# Patient Record
Sex: FEMALE | Race: WHITE | NOT HISPANIC OR LATINO | Employment: FULL TIME | URBAN - METROPOLITAN AREA
[De-identification: names, ages, dates, MRNs, and addresses within clinical notes are randomized per-mention and may not be internally consistent; named-entity substitution may affect disease eponyms.]

---

## 2019-07-10 ENCOUNTER — TRANSCRIBE ORDERS (OUTPATIENT)
Dept: MATERNAL FETAL MEDICINE | Facility: CLINIC | Age: 24
End: 2019-07-10

## 2019-07-10 ENCOUNTER — HOSPITAL ENCOUNTER (OUTPATIENT)
Dept: ULTRASOUND IMAGING | Facility: CLINIC | Age: 24
Discharge: HOME | End: 2019-07-10
Attending: OBSTETRICS & GYNECOLOGY | Admitting: OBSTETRICS & GYNECOLOGY
Payer: COMMERCIAL

## 2019-07-10 ENCOUNTER — OFFICE VISIT (OUTPATIENT)
Dept: MATERNAL FETAL MEDICINE | Facility: CLINIC | Age: 24
End: 2019-07-10
Attending: OBSTETRICS & GYNECOLOGY
Payer: COMMERCIAL

## 2019-07-10 ENCOUNTER — PRE VISIT (OUTPATIENT)
Dept: MATERNAL FETAL MEDICINE | Facility: CLINIC | Age: 24
End: 2019-07-10

## 2019-07-10 DIAGNOSIS — O26.90 PREGNANCY RELATED CONDITION, ANTEPARTUM: ICD-10-CM

## 2019-07-10 DIAGNOSIS — O35.9XX0 FETAL ABNORMALITY AFFECTING MANAGEMENT OF MOTHER, ANTEPARTUM, SINGLE OR UNSPECIFIED FETUS: ICD-10-CM

## 2019-07-10 DIAGNOSIS — O35.8XX0 CYSTIC HYGROMA OF FETUS IN SINGLETON PREGNANCY: Primary | ICD-10-CM

## 2019-07-10 DIAGNOSIS — O26.90 PREGNANCY RELATED CONDITION, ANTEPARTUM: Primary | ICD-10-CM

## 2019-07-10 DIAGNOSIS — O35.8XX0 CYSTIC HYGROMA OF FETUS IN SINGLETON PREGNANCY: ICD-10-CM

## 2019-07-10 DIAGNOSIS — O28.3 ABNORMAL PRENATAL ULTRASOUND: Primary | ICD-10-CM

## 2019-07-10 PROCEDURE — 76801 OB US < 14 WKS SINGLE FETUS: CPT

## 2019-07-10 PROCEDURE — 40000072 ZZH STATISTIC GENETIC COUNSELING, < 16 MIN: Mod: ZF | Performed by: GENETIC COUNSELOR, MS

## 2019-07-10 NOTE — PROGRESS NOTES
Please see full imaging report from ViewPoint program under imaging tab.    Findings were reviewed with Trinity and her family today, including a detailed discussion regarding outcomes/etiologies, and opportunities to obtain genetic assessment. At this time, they would like to proceed with NIPT which can be drawn at anytime after 10 weeks gestation. She is set up to have this done through our office early next week and we will see her again at 14 weeks for another US. In the interim she will have close follow up for reassurance and confirmation of ongoing viability with [facility], who were contacted today, and plan to see her for a follow up visit next week.     We did discuss up to 50% risk of aneuploidy, high risk of spontaneous fetal loss, and also risk of other concerns such as fetal abnormalities including congenital heart disease and skeletal dysplasia. There is no family history of anomalies or adverse pregnancy outcome, although Trinity has had one prior early loss  Certainly if aneuploidy screening is low risk/negative definitive testing, TORCH titers should also be considered  - this is a less common etiology but should be considered if there is a low risk of aneuploidy. She has had no fever or rash or significant sick contacts in the past several months.     Ryann Arnett MD  Maternal Fetal Medicine

## 2019-07-10 NOTE — PROGRESS NOTES
[facility] [place] [facility] [place]  Genetic Counseling Consult    Patient: Trinity High YOB: 1995   Date of Service:  7/10/19      Trinity High was seen at the [facility] [place] [facility] for genetic consultation given abnormal ultrasound findings of a cystic hygroma. Trinity was accompanied to today's appointment by her partner and her mother.    Impression/Plan:   Trinity had an ultrasound today, but gestational age was too early for first trimester screen eligibility.  See ultrasound report for details.    Plan: Trinity elected to proceed with NIPT in this pregnancy. Since her gestational age is younger than 10 weeks, she will be returning for a GC only appointment on  at [facility] for consent and blood draw. Prior to that appointment, on  at 8am she will be seeing Dr. Madeline Heredia, her OBGYN.     Pregnancy History:   /Parity:                            Age at Delivery: 24 year old  NIYA: 2020, by Last Menstrual Period                  Gestational Age: 9w6d  -  No significant complications or exposures were reported in the current pregnancy.  -  Trinity s pregnancy history is significant for one prior pregnancy.    Medical History:   Trinity s reported medical history is not expected to impact pregnancy management or risks to fetal development.       Family History:   A three-generation pedigree was obtained, and is scanned under the  Media  tab.   The following significant findings were reported by Trinity:    Trinity's father and two paternal cousins have polydactyly with no other medical concerns.    Trinity's first cousin reportedly has developmental delay due to etiology that is unknown at today's appointment.    Trinity's partner has a maternal first cousin with progressive childhood onset deafness of unknown etiology at today's appointment.     Otherwise, the reported family history is negative for multiple  miscarriages, stillbirths, birth defects, cognitive impairment, known genetic conditions, and consanguinity.       Carrier Screening:   The patient reports that she and the father of the pregnancy have  ancestry:      Cystic fibrosis is an autosomal recessive genetic condition that occurs with increased frequency in individuals of  ancestry and carrier screening for this condition is available.  In addition,  screening in the [facility] includes cystic fibrosis.      Expanded carrier screening for mutations in a large panel of genes associated with autosomal recessive conditions including cystic fibrosis, spinal muscular atrophy, and others, is now available.      Carrier screening was beyond the scope of our discussion today.        Risk Assessment:   We explained that the risk for fetal chromosome abnormalities increases with maternal age. We discussed specific features of common chromosome abnormalities, including Down syndrome, trisomy 13, trisomy 18, and sex chromosome trisomies.      - At age 23 at midtrimester, the risk to have a baby with Down syndrome is 1 in 1040.    - At age 23 at midtrimester, the risk to have a baby with any chromosome abnormality is 1 in 520.     -  The patient had a fetal anatomy scan on 7/10/19.  The ultrasound showed a cystic hygroma, which we discussed increases the risk of Cruz's Syndrome and Down Syndrome in the patient's pregnancy.      Testing Options:   We discussed the following options:   Non-invasive Prenatal Testing (NIPT)    Maternal plasma cell-free DNA testing; first trimester ultrasound with nuchal translucency and nasal bone assessment is recommended, when appropriate    Screens for fetal trisomy 21, trisomy 13, trisomy 18, and sex chromosome aneuploidy    Cannot screen for open neural tube defects; maternal serum AFP after 15 weeks is recommended       Chorionic villus sampling (CVS)    Invasive procedure typically performed in the  first trimester by which placental villi are obtained for the purpose of chromosome analysis and/or other prenatal genetic analysis    Diagnostic results; >99% sensitivity for fetal chromosome abnormalities    Cannot test for open neural tube defects; maternal serum AFP after 15 weeks is recommended       Genetic Amniocentesis    Invasive procedure typically performed in the second trimester by which amniotic fluid is obtained for the purpose of chromosome analysis and/or other prenatal genetic analysis    Diagnostic results; >99% sensitivity for fetal chromosome abnormalities    AFAFP measurement tests for open neural tube defects      We reviewed the benefits and limitations of this testing.  Screening tests provide a risk assessment specific to the pregnancy for certain fetal chromosome abnormalities, but cannot definitively diagnose or exclude a fetal chromosome abnormality.  Follow-up genetic counseling and consideration of diagnostic testing is recommended with any abnormal screening result.     Diagnostic tests carry inherent risks- including risk of miscarriage- that require careful consideration. These tests can detect fetal chromosome abnormalities with greater than 99% certainty.  Results can be compromised by maternal cell contamination or mosaicism, and are limited by the resolution of cytogenetic G-banding technology.  There is no screening nor diagnostic test that can detect all forms of birth defects or mental disability.    Plan: Trinity elected to proceed with NIPT in this pregnancy. Since her gestational age is younger than 10 weeks, she will be returning for a  only appointment on Tuesday July 16th at [facility] for consent and blood draw. Prior to that appointment, on 7/16 at 8am she will be seeing Dr. Madeline Heredia, her OBGYN.     I encouraged Trinity to call me with any questions. Face-to-face time of the meeting was 40 minutes.    Sharee Lopez MS  Genetic Counseling Intern  Maternal  Fetal Medicine  [facility]   Phone: [phone]  Pager: [phone]  Email: [email or web address]    Patient seen, evaluated and discussed with the Genetic Counseling Intern. I have verified the content of the note, which accurately reflects my assessment of the patient and the plan of care.  Supervising Genetic Counselor  Rosalia Anders MS, [facility]  Licensed Genetic Counselor   [facility]   Maternal Fetal Medicine [facility]  [email or web address]   Office: [phone] M: [phone]  Pager: [phone] Fax: [phone]

## 2019-07-16 ENCOUNTER — OFFICE VISIT (OUTPATIENT)
Dept: MATERNAL FETAL MEDICINE | Facility: CLINIC | Age: 24
End: 2019-07-16
Attending: OBSTETRICS & GYNECOLOGY
Payer: COMMERCIAL

## 2019-07-16 ENCOUNTER — HOSPITAL ENCOUNTER (OUTPATIENT)
Dept: LAB | Facility: CLINIC | Age: 24
Discharge: HOME | End: 2019-07-16
Attending: OBSTETRICS & GYNECOLOGY | Admitting: OBSTETRICS & GYNECOLOGY
Payer: COMMERCIAL

## 2019-07-16 DIAGNOSIS — O28.3 ABNORMAL PRENATAL ULTRASOUND: ICD-10-CM

## 2019-07-16 DIAGNOSIS — O26.90 PREGNANCY RELATED CONDITION, ANTEPARTUM: ICD-10-CM

## 2019-07-16 DIAGNOSIS — O35.8XX0 CYSTIC HYGROMA OF FETUS IN SINGLETON PREGNANCY: ICD-10-CM

## 2019-07-16 DIAGNOSIS — O35.8XX0 CYSTIC HYGROMA OF FETUS IN SINGLETON PREGNANCY: Primary | ICD-10-CM

## 2019-07-16 PROCEDURE — 96040 ZZH GENETIC COUNSELING, EACH 30 MINUTES: CPT | Mod: ZF | Performed by: GENETIC COUNSELOR, MS

## 2019-07-16 PROCEDURE — 40000791 ZZHCL STATISTIC VERIFI PRENATAL TRISOMY 21,18,13: Performed by: OBSTETRICS & GYNECOLOGY

## 2019-07-16 PROCEDURE — 36415 COLL VENOUS BLD VENIPUNCTURE: CPT | Performed by: OBSTETRICS & GYNECOLOGY

## 2019-07-16 NOTE — PROGRESS NOTES
[facility] [place]  Genetic Counseling Consult    Patient: Trinity High YOB: 1995   Date of Service: 19      Trinity High was seen at [facility] for genetic consultation to discuss the options for screening and testing for fetal chromosome abnormalities.  The indication for genetic counseling is cystic hygroma.        Impression/Plan:   1.  Trinity had a blood draw for NIPT (Innatal test through [facility]).  Results are expected within 7-10 days, and will be available in EPIC.  We will contact her to discuss the results, and a copy will be forwarded to the office of the referring OB provider. Trinity High provided verbal permission for results to be left on her voicemail at [phone]. She requested the results do include the sex.     2. Trinity was concerned when she should return for an ultrasound next. In consultation with today's staffing physician, Dr. George, a return to [facility]    next for a nuchal translucency follow-up ultrasound was offered before her 2/3 ultrasound. Trinity elected for both ultrasounds. She will have her nuchal translucency ultrasound on  and her 2/3 ultrasound on .     3. We discussed that NIPT results should be available by the time of her NT ultrasound on . Trinity and her partner have not eliminated the option of diagnostic testing but they are unsure at this point and would like to go step by step. We discussed the plan of assessing their desire for a CVS at their NT ultrasound as Trinity would be 12w5d and have more information from the NIPT and NT ultrasound to make that decision. If they elect diagnostic testing, a CVS can be performed through 13w6d gestation.    Pregnancy History:   /Parity:     Age at Delivery: 24 year old  NIYA: 2020, by Last Menstrual Period  Gestational Age: 10w5d    Medical History:   Trinity davalos reported medical history is not  "expected to impact pregnancy management or risks to fetal development.       Family History:   A three-generation pedigree was obtained during a previous 07/10/2019 visit, and is scanned under the  Media  tab. Please see that note for more details.     Carrier Screening:   Carrier screening was not discussed today.       Risk Assessment for Chromosome Conditions:   We explained that the risk for fetal chromosome abnormalities increases with maternal age. We discussed specific features of common chromosome abnormalities, including Down syndrome, trisomy 13, trisomy 18, and sex chromosome trisomies.      At age 24 at midtrimester, the risk to have a baby with Down syndrome is 1 in 990.    At age 24 at midtrimester, the risk to have a baby with any chromosome abnormality is 1 in 495.     We reviewed the increased nuchal translucency finding from the 07/10/2019 ultrasound. Trinity and her partner said they were quite overwhelmed at their last appointment. We reviewed the basics such as what the sonographers were looking for and how we use a cystic hygroma to assess risks to the pregnancy. Trinity's partner asked about the accuracy of the finding given that the \"baby is still so small\". We discussed how visualization of a cystic hygroma is a finding our sonographers are trained to see. Trinity will have a formal NT measurement done on July 30 and that could provide a numerical assessment of the hygroma. Trinity's partner explained that \"we trust you all but we just want it to not be true\". We discussed that a cystic hygroma indicates there may be abnormal development that is associated with a chromosome abnormality. The couple asked if hygromas can resolve and we discussed they can but this is more likely in the absence of a chromosome abnormality. We discussed the frustrating nature of not having the whole clinical picture yet.       Nuchal translucency refers to a region between the skin and soft tissues behind the " cervical spine. This space is expected to have an amount of fluid between the 10th and 14th weeks of gestation and is considered normal below a defined threshold. The nuchal translucency measurement is taken when the fetus measures a crown-rump length between 36 to 84 mm which corresponds to approximately the 10th to 14 week of gestation. Typically any measurement over 3.0 mm or the 95th percentile is concerning.     Increased nuchal translucency has been associated with an increased risk for aneuploidy, heart defects, and other more rare genetic conditions. Approximately 20-65% of babies who have an increased nuchal translucency on ultrasound may have an associated chromosome abnormality, most commonly Down syndrome or Cruz syndrome. As the measurement increases the chance of aneuploidy, fetal death, and major anomalies increases.  Increased nuchal translucency has been associated with a 5-20% risk for a heart defect.     Increased nuchal translucency can resolve spontaneously by the second trimester and this is reassuring in the context of a normal euploid fetus. However, due to the finding of an increased nuchal translucency, extra ultrasounds and possibly a fetal echocardiogram may still be indicated. If the increased nuchal translucency remains or progresses there is concern for an underlying abnormality even in the context of normal screening or diagnostic chromosome analysis.     A cystic hygroma is a congenital malformation resulting from lymph accumulation and are often large in size and extend further down the length of the fetus than an increased nuchal translucency. Cystic hygromas are generally more concerning than an increased nuchal translucency. The mean size of a first trimester cystic hygroma is 8mm with some measuring over 30mm. Cystic hygromas can be septated or simple. Cystic hygromas are associated with an increased risk for aneuploidy and structural malformations which can increase the risk  for miscarriages, hydrops, fetal demise, and  death.  A septated cystic hygroma does have increased risk for aneuploidy compared to simple. Furthermore, one third of euploid fetuses with first trimester septated cystic hygromas have major structural anomalies, primarily cardiac and skeletal. In comparison, over 80% of euploidy fetuses with first trimester simple cystic hygromas have them resolve within four weeks and are phenotypically normal     We discussed the options of screening by NIPT or diagnostic options such as a CVS or amniocentesis. Trinity elected for NIPT today and while she is unsure about diagnostic testing, she would like to assess the option after her NT ultrasound and once the NIPT results are available.          Testing Options:   We discussed the following options:   Non-invasive Prenatal Testing (NIPT)    Maternal plasma cell-free DNA testing; first trimester ultrasound with nuchal translucency and nasal bone assessment is recommended, when appropriate    Screens for fetal trisomy 21, trisomy 13, trisomy 18, and sex chromosome aneuploidy    Cannot screen for open neural tube defects; maternal serum AFP after 15 weeks is recommended     Chorionic villus sampling (CVS)    Invasive procedure typically performed in the first trimester by which placental villi are obtained for the purpose of chromosome analysis and/or other prenatal genetic analysis    Diagnostic results; >99% sensitivity for fetal chromosome abnormalities    Cannot test for open neural tube defects; maternal serum AFP after 15 weeks is recommended     Genetic Amniocentesis    Invasive procedure typically performed in the second trimester by which amniotic fluid is obtained for the purpose of chromosome analysis and/or other prenatal genetic analysis    Diagnostic results; >99% sensitivity for fetal chromosome abnormalities    AFAFP measurement tests for open neural tube defects     Comprehensive (Level II) ultrasound:  Detailed ultrasound performed between 18-22 weeks gestation to screen for major birth defects and markers for aneuploidy.    We reviewed the benefits and limitations of this testing.  Screening tests provide a risk assessment specific to the pregnancy for certain fetal chromosome abnormalities, but cannot definitively diagnose or exclude a fetal chromosome abnormality.  Follow-up genetic counseling and consideration of diagnostic testing is recommended with any abnormal screening result.     Diagnostic tests carry inherent risks- including risk of miscarriage- that require careful consideration.  These tests can detect fetal chromosome abnormalities with greater than 99% certainty.  Results can be compromised by maternal cell contamination or mosaicism, and are limited by the resolution of cytogenetic G-banding technology.  There is no screening nor diagnostic test that can detect all forms of birth defects or mental disability.     It was a pleasure to be involved with Trinity s care. Face-to-face time of the meeting was 25 minutes.    Rosalia Anders MS, [facility]  Licensed Genetic Counselor   [facility]   Maternal Fetal Medicine Centers  [email or web address]   Office: [phone] MFM: [phone]  Pager: [phone] Fax: [phone]

## 2019-07-29 ENCOUNTER — TELEPHONE (OUTPATIENT)
Dept: MATERNAL FETAL MEDICINE | Facility: CLINIC | Age: 24
End: 2019-07-29

## 2019-07-29 NOTE — TELEPHONE ENCOUNTER
Called Cyndy to discuss her NIPT results. Cyndy's pregnancy was diagnosed with a cystic hygroma at 9w3d.     Cyndy's NIPT was positive for Monosomy X. We discussed the positive predictive value, which is the probability that that a pregnancy with a positive result truly has the diease. For Cyndy's result, the positive predictive value is 9-14%. The 9% was provided by [employer] and the 14% is calculated from the NIPT/ Cell Free DNA Screening Predictive Value Calculator from the National Society of Genetic Counselors and  Quality.org which is based on the prevalence of the condition, and the specificity and sensitivity of the screen. This means the probability the pregnancy is affected with Cruz syndrome, based on this result, is 9-14%. However, the chance of monosomy  X in this pregnancy is likely higher due to the cystic hygroma. We discussed while I cannot provide a specific number it would likely be significantly more than 14%. We did review that while cystic hygroma is a common feature of monosomy X, this result does not diagnose the pregnancy.     Cyndy has a nuchal translucency ultrasound tomorrow on  for a formal measurement of the hygroma and a viability check. I offered we could speak more tomorrow as Cyndy was at work when I called her. We briefly discussed that we could review the possible different options people take as far as diagnostic testing. We also discussed that in the absence of diagnostic testing recommendations would be made such as fetal echocardiogram since we know there is an increased risk for cardiac defects in monosomy X. Although this recommendation would already stand due to the cystic hygroma. I mentioned I would have several written resources for Cyndy tomorrow. I explained my goal was to provide Cyndy and her  with the level of information they feel is helpful at a pace they are comfortable with.     Cyndy was understandably upset and we made the plan to  discuss more in person after her ultrasound.     We did discuss the results were negative or no abnormality detected for chromosomes 21, 18, and 12. This puts her current pregnancy at low risk for Down syndrome, trisomy 18, and trisomy 13.Although these results are reassuring, this does not replace a standard chromosome analysis from a chorionic villus sampling or amniocentesis.     MSAFP is the appropriate second trimester screening test for open neural tube defects; the maternal quad screen is not recommended.    Her results will be faxed to her primary OB provider. More notes from our discussion on Tuesday will also be faxed.   Rosalia Anders MS, [facility]  Licensed Genetic Counselor   [facility]   Maternal Fetal Medicine Centers  [email or web address]   Office: [phone] [facility]: [phone]  Pager: [phone] Fax: [phone]

## 2019-07-30 ENCOUNTER — HOSPITAL ENCOUNTER (OUTPATIENT)
Dept: ULTRASOUND IMAGING | Facility: CLINIC | Age: 24
Discharge: HOME | End: 2019-07-30
Attending: OBSTETRICS & GYNECOLOGY | Admitting: OBSTETRICS & GYNECOLOGY
Payer: COMMERCIAL

## 2019-07-30 ENCOUNTER — OFFICE VISIT (OUTPATIENT)
Dept: MATERNAL FETAL MEDICINE | Facility: CLINIC | Age: 24
End: 2019-07-30
Attending: OBSTETRICS & GYNECOLOGY
Payer: COMMERCIAL

## 2019-07-30 DIAGNOSIS — O35.8XX0 CYSTIC HYGROMA OF FETUS IN SINGLETON PREGNANCY: Primary | ICD-10-CM

## 2019-07-30 DIAGNOSIS — O28.0 ABNORMAL MATERNAL SERUM SCREENING TEST: ICD-10-CM

## 2019-07-30 DIAGNOSIS — O28.0 ABNORMAL MATERNAL SERUM SCREENING TEST: Primary | ICD-10-CM

## 2019-07-30 DIAGNOSIS — O28.3 ABNORMAL PRENATAL ULTRASOUND: ICD-10-CM

## 2019-07-30 DIAGNOSIS — O28.3 ABNORMAL FETAL ULTRASOUND: Primary | ICD-10-CM

## 2019-07-30 DIAGNOSIS — O35.8XX0 CYSTIC HYGROMA OF FETUS IN SINGLETON PREGNANCY: ICD-10-CM

## 2019-07-30 DIAGNOSIS — O26.90 PREGNANCY RELATED CONDITION, ANTEPARTUM: ICD-10-CM

## 2019-07-30 DIAGNOSIS — O28.3 ABNORMAL FETAL ULTRASOUND: ICD-10-CM

## 2019-07-30 PROCEDURE — 76801 OB US < 14 WKS SINGLE FETUS: CPT

## 2019-07-30 PROCEDURE — 40000072 ZZH STATISTIC GENETIC COUNSELING, < 16 MIN: Mod: ZF | Performed by: GENETIC COUNSELOR, MS

## 2019-07-30 NOTE — PROGRESS NOTES
Please see ultrasound report under imaging tab for details on ultrasound performed today.    Eula George MD  , OB/GYN  Maternal-Fetal Medicine  [email or web address]  [phone] (Academic office)  [phone] (Pager)

## 2019-07-30 NOTE — PROGRESS NOTES
[facility] Fetal Medicine [place]  Genetic Counseling Consult    Patient:  Trinity High YOB: 1995   Date of Service:  19      Trinity High was seen at the [facility] Fetal [facility] for genetic consultation along with Dr. George after her nuchal translucency ultrasound .  The indication for genetic counseling is cystic hygroma on ultrasound and abnormal NIPT for increased risk for monosomy X.        Impression/Plan:   I met with Trinity and her , along with Dr. George, to discuss the ultrasound findings, the recent result of increased risk of monosomy X on NIPT, and the plan going forward for diagnostic testing and pregnancy management. Please see Dr. George' ultrasound report as well.    1. Trinity had a cell-free fetal DNA test resulted and reported yesterday, which was positive for increased risk for monosomy X. While the positive predictive value (chance the result is true) is 9-14%, the chance of monosomy X in this pregnancy is likely much higher due to the cystic hygroma.     2. Trinity had a nuchal translucency ultrasound today.  Please see the ultrasound report for further details.     3. Trinity will return next on  for an ultrasound at 14w1d and on  for an ultrasound and planned amniocentesis at 15w5d    Pregnancy History:   /Parity:     Age at Delivery: 24 year old  NIYA: 2020, by Last Menstrual Period  Gestational Age: 12w5d    Medical History:   Trinity s reported medical history is not expected to impact pregnancy management or risks to fetal development.       Family History:   A three-generation pedigree was obtained during a previous 07/10/2019 visit, and is scanned under the  Media  tab. Please see that note for more details.     Carrier Screening:   Carrier screening was not discussed today.       Risk Assessment for Chromosome Conditions:   We explained that the risk for fetal chromosome abnormalities  increases with maternal age. We discussed specific features of common chromosome abnormalities, including Down syndrome, trisomy 13, trisomy 18, and sex chromosome trisomies.      At age 24 at midtrimester, the risk to have a baby with Down syndrome is 1 in 990.    At age 24 at midtrimester, the risk to have a baby with any chromosome abnormality is 1 in 495.       Trinity had maternal serum screening earlier in pregnancy.    Non-invasive Prenatal Testing (NIPT)    Maternal plasma cell-free DNA testing    Screens for fetal trisomy 21, trisomy 13, trisomy 18, and sex chromosome aneuploidy    First trimester ultrasound diagnosed a cystic hygroma    Trinity had a Innatal test earlier in pregnancy; we reviewed the results today, which are normal for chromosome 13, chromosome 18 and chromosome 21 (no aneuploidy detected). The screen was positive for monosomy X    Given the accuracy of this test, these results greatly decrease the chance for certain fetal chromosome abnormalities    We discussed the limitations of normal NIPT results    MSAFP is recommended after 15 weeks for open neural tube defect screening    Trinity was informed of the positive NIPT yesterday. We reviewed this result again today:    We discussed chromosomal inheritance. I explained that we typically have 46 chromosomes total and that chromosomes come in pairs. We inherit one copy of each chromosome from our mother and one copy of each chromosome from our father. Chromosomes are numbered 1-22 and these are the same for men and women. The 23rd pair of chromosomes is the sex chromosomes and these determine our gender. Most women have two X chromosomes and most men have one X and one Y chromosome. The presence of a Y chromosome early in development results in development as a male. If there is no Y chromosome present, an embryo develops as a female.    We discussed monosomy X, also called Cruz syndrome. This is a chromosome difference that occurs in about  1 in 2000 females. The presence of 45, X causes the symptoms of Cruz syndrome. We discussed that monosomy X is a common chromosome abnormality in pregnancy losses. In fact, approximately 10% of spontaneous abortions that have testing have a finding of monosomy X. In addition, approximately 99% of babies with monosomy X will not survive to birth. The majority of these pregnancy losses occur in the first trimester. Those affected babies that do survive to birth likely have less severe features or may be mosaic which means some of their cells have two copies of X (46,XX) and others only have one copy of X (45,X)    We discussed the finding of Trinity's abnormal cell-free result for monosomy X (Cruz syndrome). We discussed the possible explanations for this result including:    The fetus could have Cruz syndrome    The abnormal result could be confined to the placenta only. The placenta could also be mosaic and have two cells lines- a normal 46, XX and an abnormal 45,X    Due to the observation of the cystic hygroma we discussed if the pregnancy is affected by monosomy X, it likely is not confined to the placenta only.     We discussed the positive predictive value, which is the probability that that a pregnancy with a positive result truly has the diease. For Cyndy's result, the positive predictive value is 9-14%. The 9% was provided by [employer] and the 14% is calculated from the NIPT/ Cell Free DNA Screening Predictive Value Calculator from the National [employer] of Genetic Counselors and  Quality.org which is based on the prevalence of the condition, and the specificity and sensitivity of the screen. This means the probability the pregnancy is affected with Cruz syndrome, based on this result, is 9-14%. However, the chance of monosomy  X in this pregnancy is likely higher due to the cystic hygroma. We discussed while I cannot provide a specific number it would likely be significantly more than 14%. We  did review that while cystic hygroma is a common feature of monosomy X, this result does not diagnose the pregnancy.       We discussed the option of diagnostic testing to determine if the monosomy X result is a true positive and the pregnancy is affected. We discussed the procedure and associated-risks of  CVS and amniocentesis. Trinity and her  expressed interest in an early diagnostic option. They are unsure at this time how they would plan to use the results. We did briefly discuss that some couples elect for diagnostic testing for information purposes only while others use the results to make pregnancy decisions such as termination. Termination of pregnancy can be performed until 23w6d in [place]. We discussed the complexities of CVS for monosomy X since mosaicism is more common in this aneuploidy compared to others. In the case of a CVS mosaic result for monosomy X it would not provide a definitive answer since it could indicate a possibility of confined placenta mosaicism. In these cases a follow-up amniocentesis is offered which provides a more definitive answer since the sample is fetal, not placental, in nature. We discussed there is a possibility CVS will provide the answer they are seeking but it could also prompt another diagnostic test. For these reasons, Trinity and her  though it was best to simply plan for an amniocentesis. We discussed this timeline would still give them time to make pregnancy decisions.     We also discussed the tests that could be ordered on amniocentesis such as FISH (24-48 hours return) for common aneuploidies (13,18,21,X,Y) and a chromosome analysis (7-10 days return) for a karyotype. Other options would also include a microarray which identifies deleted or duplicated material in a chromosome and amniotic fluid AFP.     Amniocentesis  - This is an invasive procedure typically performed at 15 weeks or later, through which amniotic fluid is obtained for the purpose  of chromosome analysis and/or other prenatal genetic analysis.  - Amniocentesis is considered a diagnostic test for chromosome problems during pregnancy.  - The risk of pregnancy loss associated with amniocentesis is generally estimated to be 1/500 or less.  - Amniotic fluid AFP measurement is also done to screen for the possibility of open neural tube or ventral defects.    Trinity and her  were offered resources on monosomy X but declined them at this time.      Testing Options:   We discussed the following options:   Genetic Amniocentesis    Invasive procedure typically performed in the second trimester by which amniotic fluid is obtained for the purpose of chromosome analysis and/or other prenatal genetic analysis    Diagnostic results; >99% sensitivity for fetal chromosome abnormalities    AFAFP measurement tests for open neural tube defects     Comprehensive (Level II) ultrasound: Detailed ultrasound performed between 18-22 weeks gestation to screen for major birth defects and markers for aneuploidy.      We reviewed the benefits and limitations of this testing.  Screening tests provide a risk assessment specific to the pregnancy for certain fetal chromosome abnormalities, but cannot definitively diagnose or exclude a fetal chromosome abnormality.  Follow-up genetic counseling and consideration of diagnostic testing is recommended with any abnormal screening result.     Diagnostic tests carry inherent risks- including risk of miscarriage- that require careful consideration.  These tests can detect fetal chromosome abnormalities with greater than 99% certainty.  Results can be compromised by maternal cell contamination or mosaicism, and are limited by the resolution of cytogenetic G-banding technology.  There is no screening nor diagnostic test that can detect all forms of birth defects or mental disability.    It was a pleasure to be involved with Trinity s care. Face-to-face time of the meeting was 15  minutes.    Rosalia Anders MS, [facility]  Licensed Genetic Counselor   [facility]   Maternal Fetal Medicine [facility]  [email or web address]   Office: [phone] [facility]: [phone]  Pager: [phone] Fax: [phone]

## 2019-08-01 LAB — LAB SCANNED RESULT: ABNORMAL

## 2019-08-09 ENCOUNTER — OFFICE VISIT (OUTPATIENT)
Dept: MATERNAL FETAL MEDICINE | Facility: CLINIC | Age: 24
End: 2019-08-09
Attending: OBSTETRICS & GYNECOLOGY
Payer: COMMERCIAL

## 2019-08-09 ENCOUNTER — HOSPITAL ENCOUNTER (OUTPATIENT)
Dept: ULTRASOUND IMAGING | Facility: CLINIC | Age: 24
Discharge: HOME | End: 2019-08-09
Attending: OBSTETRICS & GYNECOLOGY | Admitting: OBSTETRICS & GYNECOLOGY
Payer: COMMERCIAL

## 2019-08-09 DIAGNOSIS — O28.0 ABNORMAL MATERNAL SERUM SCREENING TEST: ICD-10-CM

## 2019-08-09 DIAGNOSIS — O35.8XX0 CYSTIC HYGROMA OF FETUS IN SINGLETON PREGNANCY: ICD-10-CM

## 2019-08-09 DIAGNOSIS — O35.8XX0 CYSTIC HYGROMA OF FETUS IN SINGLETON PREGNANCY: Primary | ICD-10-CM

## 2019-08-09 DIAGNOSIS — O28.3 ABNORMAL FETAL ULTRASOUND: ICD-10-CM

## 2019-08-09 PROCEDURE — 76801 OB US < 14 WKS SINGLE FETUS: CPT

## 2019-08-09 NOTE — PROGRESS NOTES
Please refer to ultrasound report under 'Imaging' Studies of 'Chart Review' tabs.    Meng Hardy M.D.

## 2019-08-20 ENCOUNTER — HOSPITAL ENCOUNTER (OUTPATIENT)
Dept: ULTRASOUND IMAGING | Facility: CLINIC | Age: 24
Discharge: HOME | End: 2019-08-20
Attending: OBSTETRICS & GYNECOLOGY | Admitting: OBSTETRICS & GYNECOLOGY
Payer: COMMERCIAL

## 2019-08-20 ENCOUNTER — OFFICE VISIT (OUTPATIENT)
Dept: MATERNAL FETAL MEDICINE | Facility: CLINIC | Age: 24
End: 2019-08-20
Attending: OBSTETRICS & GYNECOLOGY
Payer: COMMERCIAL

## 2019-08-20 DIAGNOSIS — O35.10X0 SUSPECTED CHROMOSOME ANOMALY OF FETUS AFFECTING MANAGEMENT OF MOTHER, ANTEPARTUM, SINGLE OR UNSPECIFIED FETUS: Primary | ICD-10-CM

## 2019-08-20 DIAGNOSIS — O28.0 ABNORMAL MATERNAL SERUM SCREENING TEST: ICD-10-CM

## 2019-08-20 DIAGNOSIS — O35.10X0 SUSPECTED FETAL CHROMOSOME ANOMALY AFFECTING ANTEPARTUM CARE OF MOTHER: Primary | ICD-10-CM

## 2019-08-20 DIAGNOSIS — O28.3 ABNORMAL FETAL ULTRASOUND: ICD-10-CM

## 2019-08-20 DIAGNOSIS — O35.10X0 SUSPECTED CHROMOSOME ANOMALY OF FETUS AFFECTING MANAGEMENT OF MOTHER, ANTEPARTUM, SINGLE OR UNSPECIFIED FETUS: ICD-10-CM

## 2019-08-20 DIAGNOSIS — O35.8XX0 CYSTIC HYGROMA OF FETUS IN SINGLETON PREGNANCY: ICD-10-CM

## 2019-08-20 PROCEDURE — 76815 OB US LIMITED FETUS(S): CPT

## 2019-08-20 PROCEDURE — 96040 ZZH GENETIC COUNSELING, EACH 30 MINUTES: CPT | Mod: ZF | Performed by: GENETIC COUNSELOR, MS

## 2019-08-20 NOTE — PROGRESS NOTES
[facility] Fetal Medicine [place]  Genetic Counseling Consult    Patient: Trinity High YOB: 1995   Date of Service:  8/20/19      Trinity High was seen at the [facility] Fetal [facility] for genetic consultation to discuss and arrange amniocentesis given the high concern for monosomy X.      Impression/Plan:   Trinity met with genetic counseling today to discuss and arrange for an amniocentesis.  She has had extensive genetic counseling for the ongoing pregnancy previously, and had very few questions today.  Appropriate consent was obtained for amniocentesis and relevant genetic testing.   Unfortunately, today's ultrasound identified a fetal demise.  Trinity declined the amniocentesis today and plans to pursue chromosome analysis on products of conception.  Please see note from Dr. Dooley under imaging tab for details of today's appointment.    Trinity was encouraged to remain in contact with genetic counseling for any issues or questions that come up moving forward. Face-to-face time of the meeting was 25 minutes.  Arik Cooper MS, [facility]  Licensed Genetic Counselor  Phone: [phone]  Pager: [phone]

## 2019-08-20 NOTE — PROGRESS NOTES
"Please see \"Imaging\" tab under \"Chart Review\" for details of today's US at the [facility].    Philip Dooley MD  Maternal-Fetal Medicine    "

## 2019-08-22 ENCOUNTER — ANESTHESIA EVENT (OUTPATIENT)
Dept: SURGERY | Facility: CLINIC | Age: 24
End: 2019-08-22
Payer: COMMERCIAL

## 2019-08-22 ENCOUNTER — HOSPITAL ENCOUNTER (OUTPATIENT)
Dept: ULTRASOUND IMAGING | Facility: CLINIC | Age: 24
End: 2019-08-22
Attending: OBSTETRICS & GYNECOLOGY | Admitting: OBSTETRICS & GYNECOLOGY
Payer: COMMERCIAL

## 2019-08-22 ENCOUNTER — ANESTHESIA (OUTPATIENT)
Dept: SURGERY | Facility: CLINIC | Age: 24
End: 2019-08-22
Payer: COMMERCIAL

## 2019-08-22 ENCOUNTER — HOSPITAL ENCOUNTER (OUTPATIENT)
Facility: CLINIC | Age: 24
Discharge: HOME | End: 2019-08-22
Attending: OBSTETRICS & GYNECOLOGY | Admitting: OBSTETRICS & GYNECOLOGY
Payer: COMMERCIAL

## 2019-08-22 VITALS
DIASTOLIC BLOOD PRESSURE: 68 MMHG | TEMPERATURE: 97.9 F | HEIGHT: 64 IN | HEART RATE: 94 BPM | BODY MASS INDEX: 28.68 KG/M2 | WEIGHT: 168 LBS | OXYGEN SATURATION: 96 % | SYSTOLIC BLOOD PRESSURE: 105 MMHG | RESPIRATION RATE: 16 BRPM

## 2019-08-22 DIAGNOSIS — O03.9 MISCARRIAGE: ICD-10-CM

## 2019-08-22 DIAGNOSIS — O02.1 IUFD AT LESS THAN 20 WEEKS OF GESTATION: Primary | ICD-10-CM

## 2019-08-22 LAB
ABO + RH BLD: NORMAL
ABO + RH BLD: NORMAL
BLD GP AB SCN SERPL QL: NORMAL
BLOOD BANK CMNT PATIENT-IMP: NORMAL
BLOOD BANK CMNT PATIENT-IMP: NORMAL
SPECIMEN EXP DATE BLD: NORMAL

## 2019-08-22 PROCEDURE — 85461 HEMOGLOBIN FETAL: CPT | Performed by: OBSTETRICS & GYNECOLOGY

## 2019-08-22 PROCEDURE — 40000170 ZZH STATISTIC PRE-PROCEDURE ASSESSMENT II: Performed by: OBSTETRICS & GYNECOLOGY

## 2019-08-22 PROCEDURE — 25000128 H RX IP 250 OP 636: Performed by: NURSE ANESTHETIST, CERTIFIED REGISTERED

## 2019-08-22 PROCEDURE — 88305 TISSUE EXAM BY PATHOLOGIST: CPT | Performed by: OBSTETRICS & GYNECOLOGY

## 2019-08-22 PROCEDURE — 25000128 H RX IP 250 OP 636: Performed by: OBSTETRICS & GYNECOLOGY

## 2019-08-22 PROCEDURE — 25000566 ZZH SEVOFLURANE, EA 15 MIN: Performed by: OBSTETRICS & GYNECOLOGY

## 2019-08-22 PROCEDURE — 37000008 ZZH ANESTHESIA TECHNICAL FEE, 1ST 30 MIN: Performed by: OBSTETRICS & GYNECOLOGY

## 2019-08-22 PROCEDURE — 37000009 ZZH ANESTHESIA TECHNICAL FEE, EACH ADDTL 15 MIN: Performed by: OBSTETRICS & GYNECOLOGY

## 2019-08-22 PROCEDURE — 71000012 ZZH RECOVERY PHASE 1 LEVEL 1 FIRST HR: Performed by: OBSTETRICS & GYNECOLOGY

## 2019-08-22 PROCEDURE — 25000125 ZZHC RX 250: Performed by: NURSE ANESTHETIST, CERTIFIED REGISTERED

## 2019-08-22 PROCEDURE — 71000027 ZZH RECOVERY PHASE 2 EACH 15 MINS: Performed by: OBSTETRICS & GYNECOLOGY

## 2019-08-22 PROCEDURE — 71000013 ZZH RECOVERY PHASE 1 LEVEL 1 EA ADDTL HR: Performed by: OBSTETRICS & GYNECOLOGY

## 2019-08-22 PROCEDURE — 00000159 ZZHCL STATISTIC H-SEND OUTS PREP: Performed by: OBSTETRICS & GYNECOLOGY

## 2019-08-22 PROCEDURE — 27210794 ZZH OR GENERAL SUPPLY STERILE: Performed by: OBSTETRICS & GYNECOLOGY

## 2019-08-22 PROCEDURE — 86850 RBC ANTIBODY SCREEN: CPT | Performed by: OBSTETRICS & GYNECOLOGY

## 2019-08-22 PROCEDURE — 36000056 ZZH SURGERY LEVEL 3 1ST 30 MIN: Performed by: OBSTETRICS & GYNECOLOGY

## 2019-08-22 PROCEDURE — 36415 COLL VENOUS BLD VENIPUNCTURE: CPT | Performed by: OBSTETRICS & GYNECOLOGY

## 2019-08-22 PROCEDURE — 86901 BLOOD TYPING SEROLOGIC RH(D): CPT | Performed by: OBSTETRICS & GYNECOLOGY

## 2019-08-22 PROCEDURE — 25000132 ZZH RX MED GY IP 250 OP 250 PS 637: Performed by: OBSTETRICS & GYNECOLOGY

## 2019-08-22 PROCEDURE — 36000058 ZZH SURGERY LEVEL 3 EA 15 ADDTL MIN: Performed by: OBSTETRICS & GYNECOLOGY

## 2019-08-22 PROCEDURE — 25000125 ZZHC RX 250: Performed by: OBSTETRICS & GYNECOLOGY

## 2019-08-22 PROCEDURE — 86900 BLOOD TYPING SEROLOGIC ABO: CPT | Performed by: OBSTETRICS & GYNECOLOGY

## 2019-08-22 PROCEDURE — 40000985 US INTRAOPERATIVE: Mod: TC

## 2019-08-22 PROCEDURE — 25800030 ZZH RX IP 258 OP 636: Performed by: NURSE ANESTHETIST, CERTIFIED REGISTERED

## 2019-08-22 PROCEDURE — 88305 TISSUE EXAM BY PATHOLOGIST: CPT | Mod: 26 | Performed by: OBSTETRICS & GYNECOLOGY

## 2019-08-22 RX ORDER — SODIUM CHLORIDE, SODIUM LACTATE, POTASSIUM CHLORIDE, CALCIUM CHLORIDE 600; 310; 30; 20 MG/100ML; MG/100ML; MG/100ML; MG/100ML
INJECTION, SOLUTION INTRAVENOUS CONTINUOUS
Status: DISCONTINUED | OUTPATIENT
Start: 2019-08-22 | End: 2019-08-22 | Stop reason: HOSPADM

## 2019-08-22 RX ORDER — ONDANSETRON 4 MG/1
4 TABLET, ORALLY DISINTEGRATING ORAL EVERY 30 MIN PRN
Status: DISCONTINUED | OUTPATIENT
Start: 2019-08-22 | End: 2019-08-22 | Stop reason: HOSPADM

## 2019-08-22 RX ORDER — METHYLERGONOVINE MALEATE 0.2 MG/1
0.2 TABLET ORAL EVERY 6 HOURS
Qty: 6 TABLET | Refills: 0 | Status: ON HOLD | OUTPATIENT
Start: 2019-08-22 | End: 2021-01-07

## 2019-08-22 RX ORDER — DEXAMETHASONE SODIUM PHOSPHATE 4 MG/ML
INJECTION, SOLUTION INTRA-ARTICULAR; INTRALESIONAL; INTRAMUSCULAR; INTRAVENOUS; SOFT TISSUE PRN
Status: DISCONTINUED | OUTPATIENT
Start: 2019-08-22 | End: 2019-08-22

## 2019-08-22 RX ORDER — ONDANSETRON 2 MG/ML
4 INJECTION INTRAMUSCULAR; INTRAVENOUS EVERY 30 MIN PRN
Status: DISCONTINUED | OUTPATIENT
Start: 2019-08-22 | End: 2019-08-22 | Stop reason: HOSPADM

## 2019-08-22 RX ORDER — LABETALOL HYDROCHLORIDE 5 MG/ML
10 INJECTION, SOLUTION INTRAVENOUS
Status: DISCONTINUED | OUTPATIENT
Start: 2019-08-22 | End: 2019-08-22 | Stop reason: HOSPADM

## 2019-08-22 RX ORDER — SODIUM CHLORIDE, SODIUM LACTATE, POTASSIUM CHLORIDE, CALCIUM CHLORIDE 600; 310; 30; 20 MG/100ML; MG/100ML; MG/100ML; MG/100ML
INJECTION, SOLUTION INTRAVENOUS CONTINUOUS PRN
Status: DISCONTINUED | OUTPATIENT
Start: 2019-08-22 | End: 2019-08-22

## 2019-08-22 RX ORDER — PROPOFOL 10 MG/ML
INJECTION, EMULSION INTRAVENOUS PRN
Status: DISCONTINUED | OUTPATIENT
Start: 2019-08-22 | End: 2019-08-22

## 2019-08-22 RX ORDER — ACETAMINOPHEN 325 MG/1
975 TABLET ORAL ONCE
Status: COMPLETED | OUTPATIENT
Start: 2019-08-22 | End: 2019-08-22

## 2019-08-22 RX ORDER — FENTANYL CITRATE 50 UG/ML
25-50 INJECTION, SOLUTION INTRAMUSCULAR; INTRAVENOUS
Status: DISCONTINUED | OUTPATIENT
Start: 2019-08-22 | End: 2019-08-22 | Stop reason: HOSPADM

## 2019-08-22 RX ORDER — LIDOCAINE HYDROCHLORIDE 20 MG/ML
INJECTION, SOLUTION INFILTRATION; PERINEURAL PRN
Status: DISCONTINUED | OUTPATIENT
Start: 2019-08-22 | End: 2019-08-22

## 2019-08-22 RX ORDER — HYDROMORPHONE HYDROCHLORIDE 1 MG/ML
.3-.5 INJECTION, SOLUTION INTRAMUSCULAR; INTRAVENOUS; SUBCUTANEOUS EVERY 10 MIN PRN
Status: DISCONTINUED | OUTPATIENT
Start: 2019-08-22 | End: 2019-08-22 | Stop reason: HOSPADM

## 2019-08-22 RX ORDER — CEFAZOLIN SODIUM 1 G/50ML
2 SOLUTION INTRAVENOUS
Status: COMPLETED | OUTPATIENT
Start: 2019-08-22 | End: 2019-08-22

## 2019-08-22 RX ORDER — CEFAZOLIN SODIUM 1 G/3ML
1 INJECTION, POWDER, FOR SOLUTION INTRAMUSCULAR; INTRAVENOUS SEE ADMIN INSTRUCTIONS
Status: DISCONTINUED | OUTPATIENT
Start: 2019-08-22 | End: 2019-08-22 | Stop reason: HOSPADM

## 2019-08-22 RX ORDER — FENTANYL CITRATE 50 UG/ML
25-50 INJECTION, SOLUTION INTRAMUSCULAR; INTRAVENOUS EVERY 5 MIN PRN
Status: DISCONTINUED | OUTPATIENT
Start: 2019-08-22 | End: 2019-08-22 | Stop reason: HOSPADM

## 2019-08-22 RX ORDER — KETOROLAC TROMETHAMINE 30 MG/ML
INJECTION, SOLUTION INTRAMUSCULAR; INTRAVENOUS PRN
Status: DISCONTINUED | OUTPATIENT
Start: 2019-08-22 | End: 2019-08-22

## 2019-08-22 RX ORDER — OXYTOCIN 10 [USP'U]/ML
INJECTION, SOLUTION INTRAMUSCULAR; INTRAVENOUS PRN
Status: DISCONTINUED | OUTPATIENT
Start: 2019-08-22 | End: 2019-08-22

## 2019-08-22 RX ORDER — NALOXONE HYDROCHLORIDE 0.4 MG/ML
.1-.4 INJECTION, SOLUTION INTRAMUSCULAR; INTRAVENOUS; SUBCUTANEOUS
Status: DISCONTINUED | OUTPATIENT
Start: 2019-08-22 | End: 2019-08-22 | Stop reason: HOSPADM

## 2019-08-22 RX ORDER — FENTANYL CITRATE 50 UG/ML
INJECTION, SOLUTION INTRAMUSCULAR; INTRAVENOUS PRN
Status: DISCONTINUED | OUTPATIENT
Start: 2019-08-22 | End: 2019-08-22

## 2019-08-22 RX ORDER — PROPOFOL 10 MG/ML
INJECTION, EMULSION INTRAVENOUS CONTINUOUS PRN
Status: DISCONTINUED | OUTPATIENT
Start: 2019-08-22 | End: 2019-08-22

## 2019-08-22 RX ORDER — MEPERIDINE HYDROCHLORIDE 25 MG/ML
12.5 INJECTION INTRAMUSCULAR; INTRAVENOUS; SUBCUTANEOUS
Status: DISCONTINUED | OUTPATIENT
Start: 2019-08-22 | End: 2019-08-22 | Stop reason: HOSPADM

## 2019-08-22 RX ORDER — ONDANSETRON 2 MG/ML
INJECTION INTRAMUSCULAR; INTRAVENOUS PRN
Status: DISCONTINUED | OUTPATIENT
Start: 2019-08-22 | End: 2019-08-22

## 2019-08-22 RX ORDER — MAGNESIUM HYDROXIDE 1200 MG/15ML
LIQUID ORAL PRN
Status: DISCONTINUED | OUTPATIENT
Start: 2019-08-22 | End: 2019-08-22 | Stop reason: HOSPADM

## 2019-08-22 RX ADMIN — ONDANSETRON 4 MG: 2 INJECTION INTRAMUSCULAR; INTRAVENOUS at 12:05

## 2019-08-22 RX ADMIN — LIDOCAINE HYDROCHLORIDE 100 MG: 20 INJECTION, SOLUTION INFILTRATION; PERINEURAL at 11:59

## 2019-08-22 RX ADMIN — DEXAMETHASONE SODIUM PHOSPHATE 4 MG: 4 INJECTION, SOLUTION INTRA-ARTICULAR; INTRALESIONAL; INTRAMUSCULAR; INTRAVENOUS; SOFT TISSUE at 12:05

## 2019-08-22 RX ADMIN — CEFAZOLIN SODIUM 2 G: 10 INJECTION, POWDER, FOR SOLUTION INTRAVENOUS at 12:05

## 2019-08-22 RX ADMIN — HUMAN RHO(D) IMMUNE GLOBULIN 300 MCG: 300 INJECTION, SOLUTION INTRAMUSCULAR at 13:50

## 2019-08-22 RX ADMIN — OXYTOCIN 10 UNITS: 10 INJECTION, SOLUTION INTRAMUSCULAR; INTRAVENOUS at 12:16

## 2019-08-22 RX ADMIN — PROPOFOL 200 MG: 10 INJECTION, EMULSION INTRAVENOUS at 11:59

## 2019-08-22 RX ADMIN — ACETAMINOPHEN 975 MG: 325 TABLET, FILM COATED ORAL at 10:24

## 2019-08-22 RX ADMIN — SODIUM CHLORIDE, POTASSIUM CHLORIDE, SODIUM LACTATE AND CALCIUM CHLORIDE: 600; 310; 30; 20 INJECTION, SOLUTION INTRAVENOUS at 11:55

## 2019-08-22 RX ADMIN — KETOROLAC TROMETHAMINE 30 MG: 30 INJECTION, SOLUTION INTRAMUSCULAR at 12:16

## 2019-08-22 RX ADMIN — FENTANYL CITRATE 50 MCG: 50 INJECTION, SOLUTION INTRAMUSCULAR; INTRAVENOUS at 11:56

## 2019-08-22 RX ADMIN — PROPOFOL 200 MCG/KG/MIN: 10 INJECTION, EMULSION INTRAVENOUS at 11:59

## 2019-08-22 RX ADMIN — MIDAZOLAM 1 MG: 1 INJECTION INTRAMUSCULAR; INTRAVENOUS at 11:56

## 2019-08-22 ASSESSMENT — MIFFLIN-ST. JEOR: SCORE: 1492.04

## 2019-08-22 NOTE — OP NOTE
Procedure Date: 08/22/2019      PREOPERATIVE DIAGNOSES:  Intrauterine fetal demise, 14 weeks gestation, known monosomy X.      POSTOPERATIVE DIAGNOSES:  Intrauterine fetal demise, 14 weeks gestation, known monosomy X.       PROCEDURE PERFORMED:  Dilation and suction evacuation with ultrasound guidance.      SURGEON:  Hector Loaiza MD.       ANESTHESIA:  General.      BLOOD LOSS:  75 mL.      COMPLICATIONS:  None.      INDICATIONS AND CONSENT:  This patient is a 23-year-old with a known monosomy X  who has a fetal demise of 14 weeks' size.  The patient was offered suction D and E.  She has previously had laminaria placed 1 day prior to procedure.  The benefits and risks were discussed with the patient including her increased risks of bleeding, infection and uterine injury, possible injury to bowels or bladder, perioperative risks and blood transfusions.  Clear informed consent was obtained.      DESCRIPTION OF PROCEDURE:  The patient was taken to the operating room where general anesthesia was administered.  She was placed in dorsal lithotomy position.  The perineum and vagina were prepped and draped in the usual sterile fashion.  Laminaria removed.  Examination reveals the cervix to be dilated significantly post-laminaria.  Additional dilation up to a #12 dilator is carried out under direct ultrasound guidance.  Ultrasound guidance was then used for the entirety of the procedure and a #12 curet was inserted, and in several passes, the uterus was emptied under direct visualization.  Ring forceps were utilized to withdraw additional fetal fragments.  The placenta was completely emptied with suction curettage.  Sharp curettage was not required.  Post-procedure, the uterus is emptied.  There were no complications.  The uterus involuted well, responding to massage and oxytocin infusion.  The total blood loss was 75 mL.  Sponge counts were correct.  Specimen was sent for permanent pathologic evaluation.  The patient is Rh  negative and will receive rho-JESUS.         JOSH SURESH MD             D: 2019   T: 2019   MT:       Name:     SABRINA VALLECILLO   MRN:      [id number]        Account:        [id number]   :      1995           Procedure Date: 2019      Document: [id number]

## 2019-08-22 NOTE — DISCHARGE INSTRUCTIONS
Dr. Loaiza is okay with holding off on methergine at this time, however if you begin to bleed consistently please call his office and you will be able to discuss options regarding the methergine prescription.    Today you were given 975 mg of Tylenol at *1030*The recommended daily maximum dose is 4000 mg.     Today you received Toradol, an antiinflammatory medication similar to Ibuprofen.  You should not take other antiinflammatory medication, such as Ibuprofen, Motrin, Advil, Aleve, Naprosyn, etc until 6:16PM.    Discharge Instructions for D&C    Activity   You may resume normal activities including lifting.  It is permissible to climb stairs.  You may drive a car in 24 hours unless you are taking narcotic pain medication. Baths or showers are acceptable.  Avoid tampons and intercourse for one week.  You may return to work in two to three days.     Office Visit   Make an appointment for approximately four weeks from the day of surgery, unless directed otherwise.     Vaginal Drainage   You may have some bleeding or vaginal discharge for about one week; occasionally, the spotting may last until 14 days after surgery.      Temperature   If you develop temperature elevations to 101 F or over, please call the office.     Tissue Reports   Please allow at least TWO full working days before final reports are available.  Therefore,call us on the third day, or later at your convenience, for this report.  [facility]  Madeline Heredia M.D.  MATT Lugo M.D.  MATT Mejia M.D.  Baljeet Sierra M.D.  Adri Joshi M.D.    [address]                                   [address] [place], [place], [place]  [phone]          Same Day Surgery Discharge Instructions for  Sedation and  General Anesthesia       It's not unusual to feel dizzy, light-headed or faint for up to 24 hours after surgery or while taking pain medication.  If you have these symptoms: sit for a few minutes before standing and have someone assist you when you get up to walk or use the bathroom.      You should rest and relax for the next 24 hours. We recommend you make arrangements to have an adult stay with you for at least 24 hours after your discharge.  Avoid hazardous and strenuous activity.      DO NOT DRIVE any vehicle or operate mechanical equipment for 24 hours following the end of your surgery.  Even though you may feel normal, your reactions may be affected by the medication you have received.      Do not drink alcoholic beverages for 24 hours following surgery.       Slowly progress to your regular diet as you feel able. It's not unusual to feel nauseated and/or vomit after receiving anesthesia.  If you develop these symptoms, drink clear liquids (apple juice, ginger ale, broth, 7-up, etc. ) until you feel better.  If your nausea and vomiting persists for 24 hours, please notify your surgeon.        All narcotic pain medications, along with inactivity and anesthesia, can cause constipation. Drinking plenty of liquids and increasing fiber intake will help.      For any questions of a medical nature, call your surgeon.      Do not make important decisions for 24 hours.      If you had general anesthesia, you may have a sore throat for a couple of days related to the breathing tube used during surgery.  You may use Cepacol lozenges to help with this discomfort.  If it worsens or if you develop a fever, contact your surgeon.       If you feel your pain is not well managed with the pain medications prescribed by your surgeon, please contact your surgeon's office to let them know so they can address your concerns.     ____________________________________________    Our hearts go out to you at this difficult time. Be  assured that there is no right way to find comfort and support. It may take time to find out what works for you.  Please do not hesitate to ask for support from our nurses, social workers, or chaplains.  After you leave the hospital, if you need help finding support resources, please call [phone].  [facility] hosts a support group for anyone who has had a pregnancy loss providing a supportive place to learn and share. Couples are encouraged to attend together.     Where: [facility], [facility], [facility]  When: 1st and 3rd Thursday of each month, 5:00pm - 6:30pm  To register, contact:    Brendan *[phone] [email or web address]   Marilou *[phone] *[email or web address]    ______________________________________________

## 2019-08-22 NOTE — ANESTHESIA PREPROCEDURE EVALUATION
Anesthesia Pre-Procedure Evaluation    Patient: Trinity High   MRN: [id number] : 1995          Preoperative Diagnosis: 14 WEEK FETAL DEMISE    Procedure(s):  SUCTION DILATION AND EVACUATION    Past Medical History:   Diagnosis Date     Menorrhagia      Past Surgical History:   Procedure Laterality Date     BREAST SURGERY      BILATERAL BREAST REDUCTION     GYN SURGERY      D&C       Anesthesia Evaluation     .             ROS/MED HX    ENT/Pulmonary:      (-) sleep apnea   Neurologic:       Cardiovascular:         METS/Exercise Tolerance:     Hematologic:         Musculoskeletal:         GI/Hepatic:     (+) GERD       Renal/Genitourinary:         Endo:         Psychiatric:         Infectious Disease:         Malignancy:         Other: Comment: Menorrhagia;                          Physical Exam  Normal systems: cardiovascular and pulmonary    Airway   Mallampati: II  TM distance: >3 FB  Neck ROM: full    Dental   (+) lower retainer    Cardiovascular       Pulmonary             No results found for: WBC, HGB, HCT, PLT, CRP, SED, NA, POTASSIUM, CHLORIDE, CO2, BUN, CR, GLC, RAUL, PHOS, MAG, ALBUMIN, PROTTOTAL, ALT, AST, GGT, ALKPHOS, BILITOTAL, BILIDIRECT, LIPASE, AMYLASE, ANA, PTT, INR, FIBR, TSH, T4, T3, HCG, HCGS, CKTOTAL, CKMB, TROPN    Preop Vitals  BP Readings from Last 3 Encounters:   No data found for BP    Pulse Readings from Last 3 Encounters:   No data found for Pulse      Resp Readings from Last 3 Encounters:   No data found for Resp    SpO2 Readings from Last 3 Encounters:   No data found for SpO2      Temp Readings from Last 1 Encounters:   No data found for Temp    Ht Readings from Last 1 Encounters:   No data found for Ht      Wt Readings from Last 1 Encounters:   No data found for Wt    There is no height or weight on file to calculate BMI.       Anesthesia Plan      History & Physical Review  History and physical reviewed and following examination; no interval change.    ASA Status:  2 .     NPO Status:  > 8 hours    Plan for General and LMA with Intravenous induction. Maintenance will be TIVA.    PONV prophylaxis:  Ondansetron (or other 5HT-3) and Dexamethasone or Solumedrol       Postoperative Care  Postoperative pain management:  IV analgesics.      Consents  Anesthetic plan, risks, benefits and alternatives discussed with:  Patient..                 CALVIN NAVARRO MD

## 2019-08-22 NOTE — ANESTHESIA CARE TRANSFER NOTE
Patient: Trinity High    Procedure(s):  SUCTION DILATION AND EVACUATION WITH ULTRASOUND GUIDANCE    Diagnosis: 14 WEEK FETAL DEMISE  Diagnosis Additional Information: No value filed.    Anesthesia Type:   General, LMA     Note:  Airway :Face Mask  Patient transferred to:PACU  Comments: Honorio Report: Identifed the Patient, Identified the Reponsible Provider, Reviewed the pertinent medical history, Discussed the surgical course, Reviewed Intra-OP anesthesia mangement and issues during anesthesia, Set expectations for post-procedure period and Allowed opportunity for questions and acknowledgement of understanding      Vitals: (Last set prior to Anesthesia Care Transfer)    CRNA VITALS  8/22/2019 1159 - 8/22/2019 1234      8/22/2019             Pulse:  96    SpO2:  100 %    Resp Rate (set):  10                Electronically Signed By: CIRA Pace CRNA  August 22, 2019  12:34 PM

## 2019-08-22 NOTE — OR NURSING
Assumed cares while primary nurse went on break from 1310 to 1340.   57M with PMhx of HTN, asthma, ?hyperthyroid and sleep apnea presents to the ED after a witness fall, found to have markedly elevated free t3 and t4, and waxing and waning mental status, admitted for hyperthyroidism and likely post concussive amnesia.

## 2019-08-22 NOTE — BRIEF OP NOTE
[facility] Brief Operative Note    Pre-operative diagnosis: 14 WEEK FETAL DEMISE   Post-operative diagnosis same   Procedure: Procedure(s):  SUCTION DILATION AND EVACUATION WITH ULTRASOUND GUIDANCE   Surgeon(s): Surgeon(s) and Role:     * Hector Loaiza MD - Primary   Estimated blood loss: * No values recorded between 8/22/2019 12:07 PM and 8/22/2019 12:19 PM *    Specimens: ID Type Source Tests Collected by Time Destination   A : Products of Conception Products of Conception Placenta/ Fragments/ Fetus from Miscarriage or Termination SURGICAL PATHOLOGY EXAM Hector Loaiza MD 8/22/2019 12:10 PM       Findings:

## 2019-08-22 NOTE — ANESTHESIA POSTPROCEDURE EVALUATION
Patient: Trinity High    Procedure(s):  SUCTION DILATION AND EVACUATION WITH ULTRASOUND GUIDANCE    Diagnosis:14 WEEK FETAL DEMISE  Diagnosis Additional Information: No value filed.    Anesthesia Type:  General, LMA    Note:  Anesthesia Post Evaluation    Patient location during evaluation: PACU  Patient participation: Able to fully participate in evaluation  Level of consciousness: awake  Pain management: adequate  Airway patency: patent  Cardiovascular status: acceptable  Respiratory status: acceptable  Hydration status: acceptable  PONV: none     Anesthetic complications: None          Last vitals:  Vitals:    08/22/19 1343 08/22/19 1345 08/22/19 1440   BP: 105/76 105/76 105/68   Pulse: 94     Resp: 17 16 16   Temp:      SpO2: 98% 98% 96%         Electronically Signed By: Tiffanie Valero MD, MD  August 22, 2019  2:46 PM

## 2019-08-22 NOTE — OR NURSING
Pt saturated one pad in Phase I.  Pt is up in a chair and the plan is to have her sit in Phase I for 15 minutes and then go to the rest room to reassess how much vaginal bleeding the pt is continuing to have.  Pt did received pitocin 20 Units via her bag of LR and due to the nature of her procedure this amount of bleeding thus far is within the normal range.

## 2019-08-23 LAB
ABO + RH BLD: NORMAL
ABO + RH BLD: NORMAL
BLOOD BANK CMNT PATIENT-IMP: NORMAL
COPATH REPORT: NORMAL
DATE RH IMM GL GVN: NORMAL
FETAL CELL SCN BLD QL ROSETTE: NORMAL
RH IG VIALS RECOM PATIENT: NORMAL

## 2020-03-01 ENCOUNTER — HEALTH MAINTENANCE LETTER (OUTPATIENT)
Age: 25
End: 2020-03-01

## 2020-06-24 ENCOUNTER — TRANSFERRED RECORDS (OUTPATIENT)
Dept: HEALTH INFORMATION MANAGEMENT | Facility: CLINIC | Age: 25
End: 2020-06-24

## 2020-06-24 ENCOUNTER — MEDICAL CORRESPONDENCE (OUTPATIENT)
Dept: HEALTH INFORMATION MANAGEMENT | Facility: CLINIC | Age: 25
End: 2020-06-24

## 2020-06-24 LAB
HBV SURFACE AG SERPL QL IA: NEGATIVE
HIV 1+2 AB+HIV1 P24 AG SERPL QL IA: NON REACTIVE
RUBELLA ABY IGG: NORMAL
TREPONEMA ANTIBODIES: NON REACTIVE

## 2020-06-25 ENCOUNTER — TRANSCRIBE ORDERS (OUTPATIENT)
Dept: MATERNAL FETAL MEDICINE | Facility: CLINIC | Age: 25
End: 2020-06-25

## 2020-06-25 DIAGNOSIS — O26.90 PREGNANCY RELATED CONDITION, ANTEPARTUM: Primary | ICD-10-CM

## 2020-10-29 LAB — T PALLIDUM IGG SER QL: NON REACTIVE

## 2020-12-14 ENCOUNTER — HEALTH MAINTENANCE LETTER (OUTPATIENT)
Age: 25
End: 2020-12-14

## 2020-12-22 LAB — GROUP B STREP PCR: NEGATIVE

## 2021-01-07 ENCOUNTER — HOSPITAL ENCOUNTER (INPATIENT)
Facility: CLINIC | Age: 26
LOS: 3 days | Discharge: HOME | End: 2021-01-10
Attending: OBSTETRICS & GYNECOLOGY | Admitting: OBSTETRICS & GYNECOLOGY
Payer: COMMERCIAL

## 2021-01-07 LAB
ABO + RH BLD: ABNORMAL
ABO + RH BLD: ABNORMAL
ALT SERPL W P-5'-P-CCNC: 18 U/L (ref 0–50)
ANION GAP SERPL CALCULATED.3IONS-SCNC: 7 MMOL/L (ref 3–14)
AST SERPL W P-5'-P-CCNC: 21 U/L (ref 0–45)
BASOPHILS # BLD AUTO: 0 10E9/L (ref 0–0.2)
BASOPHILS NFR BLD AUTO: 0.2 %
BLD GP AB INVEST PLASRBC-IMP: ABNORMAL
BLD GP AB SCN SERPL QL: ABNORMAL
BLOOD BANK CMNT PATIENT-IMP: ABNORMAL
BUN SERPL-MCNC: 10 MG/DL (ref 7–30)
CALCIUM SERPL-MCNC: 9.2 MG/DL (ref 8.5–10.1)
CHLORIDE SERPL-SCNC: 107 MMOL/L (ref 94–109)
CO2 SERPL-SCNC: 23 MMOL/L (ref 20–32)
CREAT SERPL-MCNC: 0.6 MG/DL (ref 0.52–1.04)
CREAT UR-MCNC: 162 MG/DL
DIFFERENTIAL METHOD BLD: ABNORMAL
EOSINOPHIL # BLD AUTO: 0 10E9/L (ref 0–0.7)
EOSINOPHIL NFR BLD AUTO: 0.2 %
ERYTHROCYTE [DISTWIDTH] IN BLOOD BY AUTOMATED COUNT: 16.7 % (ref 10–15)
GFR SERPL CREATININE-BSD FRML MDRD: >90 ML/MIN/{1.73_M2}
GLUCOSE SERPL-MCNC: 108 MG/DL (ref 70–99)
HCT VFR BLD AUTO: 38.2 % (ref 35–47)
HGB BLD-MCNC: 11.9 G/DL (ref 11.7–15.7)
IMM GRANULOCYTES # BLD: 0.1 10E9/L (ref 0–0.4)
IMM GRANULOCYTES NFR BLD: 0.8 %
LYMPHOCYTES # BLD AUTO: 1.8 10E9/L (ref 0.8–5.3)
LYMPHOCYTES NFR BLD AUTO: 15 %
MCH RBC QN AUTO: 28.3 PG (ref 26.5–33)
MCHC RBC AUTO-ENTMCNC: 31.2 G/DL (ref 31.5–36.5)
MCV RBC AUTO: 91 FL (ref 78–100)
MONOCYTES # BLD AUTO: 0.9 10E9/L (ref 0–1.3)
MONOCYTES NFR BLD AUTO: 7.2 %
NEUTROPHILS # BLD AUTO: 9.4 10E9/L (ref 1.6–8.3)
NEUTROPHILS NFR BLD AUTO: 76.6 %
NRBC # BLD AUTO: 0 10*3/UL
NRBC BLD AUTO-RTO: 0 /100
PLATELET # BLD AUTO: 143 10E9/L (ref 150–450)
POTASSIUM SERPL-SCNC: 3.6 MMOL/L (ref 3.4–5.3)
PROT UR-MCNC: 0.32 G/L
PROT/CREAT 24H UR: 0.2 G/G CR (ref 0–0.2)
RBC # BLD AUTO: 4.2 10E12/L (ref 3.8–5.2)
SODIUM SERPL-SCNC: 137 MMOL/L (ref 133–144)
SPECIMEN EXP DATE BLD: ABNORMAL
URATE SERPL-MCNC: 5.6 MG/DL (ref 2.6–6)
WBC # BLD AUTO: 12.2 10E9/L (ref 4–11)

## 2021-01-07 PROCEDURE — 80048 BASIC METABOLIC PNL TOTAL CA: CPT | Performed by: OBSTETRICS & GYNECOLOGY

## 2021-01-07 PROCEDURE — 120N000001 HC R&B MED SURG/OB

## 2021-01-07 PROCEDURE — 85025 COMPLETE CBC W/AUTO DIFF WBC: CPT | Performed by: OBSTETRICS & GYNECOLOGY

## 2021-01-07 PROCEDURE — 84450 TRANSFERASE (AST) (SGOT): CPT | Performed by: OBSTETRICS & GYNECOLOGY

## 2021-01-07 PROCEDURE — 86901 BLOOD TYPING SEROLOGIC RH(D): CPT | Performed by: OBSTETRICS & GYNECOLOGY

## 2021-01-07 PROCEDURE — 250N000013 HC RX MED GY IP 250 OP 250 PS 637: Performed by: OBSTETRICS & GYNECOLOGY

## 2021-01-07 PROCEDURE — 86900 BLOOD TYPING SEROLOGIC ABO: CPT | Performed by: OBSTETRICS & GYNECOLOGY

## 2021-01-07 PROCEDURE — 86850 RBC ANTIBODY SCREEN: CPT | Performed by: OBSTETRICS & GYNECOLOGY

## 2021-01-07 PROCEDURE — 86870 RBC ANTIBODY IDENTIFICATION: CPT | Performed by: OBSTETRICS & GYNECOLOGY

## 2021-01-07 PROCEDURE — 84156 ASSAY OF PROTEIN URINE: CPT | Performed by: OBSTETRICS & GYNECOLOGY

## 2021-01-07 PROCEDURE — 84550 ASSAY OF BLOOD/URIC ACID: CPT | Performed by: OBSTETRICS & GYNECOLOGY

## 2021-01-07 PROCEDURE — 84460 ALANINE AMINO (ALT) (SGPT): CPT | Performed by: OBSTETRICS & GYNECOLOGY

## 2021-01-07 PROCEDURE — 86780 TREPONEMA PALLIDUM: CPT | Performed by: OBSTETRICS & GYNECOLOGY

## 2021-01-07 RX ORDER — VITAMIN A ACETATE, .BETA.-CAROTENE, ASCORBIC ACID, CHOLECALCIFEROL, .ALPHA.-TOCOPHEROL ACETATE, DL-, THIAMINE MONONITRATE, RIBOFLAVIN, NIACINAMIDE, PYRIDOXINE HYDROCHLORIDE, FOLIC ACID, CYANOCOBALAMIN, CALCIUM CARBONATE, FERROUS FUMARATE, ZINC OXIDE, AND CUPRIC OXIDE 2000; 2000; 120; 400; 22; 1.84; 3; 20; 10; 1; 12; 200; 27; 25; 2 [IU]/1; [IU]/1; MG/1; [IU]/1; MG/1; MG/1; MG/1; MG/1; MG/1; MG/1; UG/1; MG/1; MG/1; MG/1; MG/1
1 TABLET ORAL DAILY
COMMUNITY

## 2021-01-07 RX ORDER — IBUPROFEN 800 MG/1
800 TABLET, FILM COATED ORAL
Status: COMPLETED | OUTPATIENT
Start: 2021-01-07 | End: 2021-01-08

## 2021-01-07 RX ORDER — OXYTOCIN/0.9 % SODIUM CHLORIDE 30/500 ML
100-340 PLASTIC BAG, INJECTION (ML) INTRAVENOUS CONTINUOUS PRN
Status: COMPLETED | OUTPATIENT
Start: 2021-01-07 | End: 2021-01-08

## 2021-01-07 RX ORDER — OXYCODONE AND ACETAMINOPHEN 5; 325 MG/1; MG/1
1 TABLET ORAL
Status: DISCONTINUED | OUTPATIENT
Start: 2021-01-07 | End: 2021-01-08

## 2021-01-07 RX ORDER — NALOXONE HYDROCHLORIDE 0.4 MG/ML
0.4 INJECTION, SOLUTION INTRAMUSCULAR; INTRAVENOUS; SUBCUTANEOUS
Status: DISCONTINUED | OUTPATIENT
Start: 2021-01-07 | End: 2021-01-08

## 2021-01-07 RX ORDER — TRANEXAMIC ACID 10 MG/ML
1 INJECTION, SOLUTION INTRAVENOUS EVERY 30 MIN PRN
Status: DISCONTINUED | OUTPATIENT
Start: 2021-01-07 | End: 2021-01-08

## 2021-01-07 RX ORDER — CARBOPROST TROMETHAMINE 250 UG/ML
250 INJECTION, SOLUTION INTRAMUSCULAR
Status: DISCONTINUED | OUTPATIENT
Start: 2021-01-07 | End: 2021-01-08

## 2021-01-07 RX ORDER — ACETAMINOPHEN 325 MG/1
650 TABLET ORAL EVERY 4 HOURS PRN
Status: DISCONTINUED | OUTPATIENT
Start: 2021-01-07 | End: 2021-01-08

## 2021-01-07 RX ORDER — SODIUM CHLORIDE, SODIUM LACTATE, POTASSIUM CHLORIDE, CALCIUM CHLORIDE 600; 310; 30; 20 MG/100ML; MG/100ML; MG/100ML; MG/100ML
INJECTION, SOLUTION INTRAVENOUS CONTINUOUS
Status: DISCONTINUED | OUTPATIENT
Start: 2021-01-07 | End: 2021-01-08

## 2021-01-07 RX ORDER — NALOXONE HYDROCHLORIDE 0.4 MG/ML
0.2 INJECTION, SOLUTION INTRAMUSCULAR; INTRAVENOUS; SUBCUTANEOUS
Status: DISCONTINUED | OUTPATIENT
Start: 2021-01-07 | End: 2021-01-08

## 2021-01-07 RX ORDER — FENTANYL CITRATE 50 UG/ML
50-100 INJECTION, SOLUTION INTRAMUSCULAR; INTRAVENOUS
Status: DISCONTINUED | OUTPATIENT
Start: 2021-01-07 | End: 2021-01-08

## 2021-01-07 RX ORDER — FERROUS SULFATE 325(65) MG
325 TABLET ORAL
COMMUNITY

## 2021-01-07 RX ORDER — MISOPROSTOL 100 UG/1
25 TABLET ORAL
Status: COMPLETED | OUTPATIENT
Start: 2021-01-07 | End: 2021-01-08

## 2021-01-07 RX ORDER — OXYTOCIN 10 [USP'U]/ML
10 INJECTION, SOLUTION INTRAMUSCULAR; INTRAVENOUS
Status: DISCONTINUED | OUTPATIENT
Start: 2021-01-07 | End: 2021-01-08

## 2021-01-07 RX ORDER — ONDANSETRON 2 MG/ML
4 INJECTION INTRAMUSCULAR; INTRAVENOUS EVERY 6 HOURS PRN
Status: DISCONTINUED | OUTPATIENT
Start: 2021-01-07 | End: 2021-01-08

## 2021-01-07 RX ORDER — METHYLERGONOVINE MALEATE 0.2 MG/ML
200 INJECTION INTRAVENOUS
Status: DISCONTINUED | OUTPATIENT
Start: 2021-01-07 | End: 2021-01-08

## 2021-01-07 RX ADMIN — Medication 25 MCG: at 15:17

## 2021-01-07 RX ADMIN — Medication 25 MCG: at 19:25

## 2021-01-07 RX ADMIN — Medication 25 MCG: at 21:32

## 2021-01-07 RX ADMIN — Medication 25 MCG: at 23:27

## 2021-01-07 RX ADMIN — Medication 25 MCG: at 17:17

## 2021-01-07 SDOH — HEALTH STABILITY: MENTAL HEALTH: HOW OFTEN DO YOU HAVE A DRINK CONTAINING ALCOHOL?: NEVER

## 2021-01-07 ASSESSMENT — MIFFLIN-ST. JEOR: SCORE: 1668.02

## 2021-01-07 ASSESSMENT — ACTIVITIES OF DAILY LIVING (ADL)
TOILETING_ISSUES: NO
FALL_HISTORY_WITHIN_LAST_SIX_MONTHS: NO

## 2021-01-07 NOTE — H&P
"2021          24yo  @ 38.0 wga here for IOL for suspected gestational hypertension.  Of note, she was seen for a routine prenatal care visit on 21, and was found to have new onset of mildly elevated BP of 140/80.  PIH labs at the time were within normal other than mildly low platelet count of 148k.  She presented for a follow-up visit in the office today, and BP was once again elevated at 140/86.  She is currently asymptomatic - denies persistent HAs, visual changes, or RUQ pain.  Due to her full term status, and new onset of mildly elevated blood pressures, induction for suspected gestational hypertension was recommended.  Her prenatal care has been complicated by: 1. +COVID-19 in October, mild symptoms only, 2. RH neg (s/p Rhogam on 20), 3. H/o previous 14 wk fetal demise 2/2 monosomy X s/p D&C in  (pt did not undergo NIPT screening this pregnancy).      PNLs: A neg, ABS neg, RI, RPR NR, HIV NR, HBsAg neg, GBS NEG, COVID-19 to be collected       /73   Temp 98.1  F (36.7  C) (Oral)   Resp 16   Ht 1.626 m (5' 4\")   Wt 94.8 kg (209 lb)   BMI 35.87 kg/m    Gen: NAD, A&O x 3  Abd: gravid, NT  SVE in office: ftp/50/-3, vertex  FHT: 130, moderate variability, +accels, no decels  TOCO: none    A/P: 24yo  @ 38.0 wga here for IOL for suspected gHTN, mildly elevated BPs in office x 2.  AVSS.  - repeat PIH labs (platelets 148 K 2 days ago, mildly low)  - PO cytotec per protocol for cervical ripening  - plan for eventual pitocin/amniotomy when able  - GBS neg  - h/o COVID-19 infection in October, will repeat screening      FLETCHER QUILES MD        "

## 2021-01-07 NOTE — PLAN OF CARE
Covid swab discontinued due to patient being covid positive 76 days ago and is currently asymptomatic at this time.

## 2021-01-07 NOTE — PLAN OF CARE
Data: [facility]: 2021  1:53 PM.  Reason for maternal/fetal assessment is medical induction of labor. Patient reports increasing BPs in office but denies PIH symptoms.  Patient is a .  Prenatal record reviewed. Pregnancy  has been complicated by gestational hypertension.  Gestational Age 38w0d. VSS. Fetal movement present. Patient denies uterine contractions, leaking of vaginal fluid/rupture of membranes, vaginal bleeding, abdominal pain, pelvic pressure, nausea, vomiting, headache, visual disturbances, epigastric or URQ pain, significant edema. Support person is present.   Action: Verbal consent for EFM. Triage assessment completed. Bill of rights reviewed.  Response: Patient verbalized agreement with plan. Will contact Dr Baljeet Correa with update and further orders.    TORB PO cytotec x8 doses. Will send PIH labs.

## 2021-01-08 ENCOUNTER — ANESTHESIA EVENT (OUTPATIENT)
Dept: OBGYN | Facility: CLINIC | Age: 26
End: 2021-01-08
Payer: COMMERCIAL

## 2021-01-08 ENCOUNTER — ANESTHESIA (OUTPATIENT)
Dept: OBGYN | Facility: CLINIC | Age: 26
End: 2021-01-08
Payer: COMMERCIAL

## 2021-01-08 LAB
BLOOD BANK CMNT PATIENT-IMP: NORMAL
T PALLIDUM AB SER QL: NONREACTIVE

## 2021-01-08 PROCEDURE — 120N000001 HC R&B MED SURG/OB

## 2021-01-08 PROCEDURE — 250N000009 HC RX 250: Performed by: OBSTETRICS & GYNECOLOGY

## 2021-01-08 PROCEDURE — 250N000013 HC RX MED GY IP 250 OP 250 PS 637: Performed by: OBSTETRICS & GYNECOLOGY

## 2021-01-08 PROCEDURE — 370N000003 HC ANESTHESIA WARD SERVICE

## 2021-01-08 PROCEDURE — 3E033VJ INTRODUCTION OF OTHER HORMONE INTO PERIPHERAL VEIN, PERCUTANEOUS APPROACH: ICD-10-PCS | Performed by: OBSTETRICS & GYNECOLOGY

## 2021-01-08 PROCEDURE — 250N000011 HC RX IP 250 OP 636: Performed by: ANESTHESIOLOGY

## 2021-01-08 PROCEDURE — 10907ZC DRAINAGE OF AMNIOTIC FLUID, THERAPEUTIC FROM PRODUCTS OF CONCEPTION, VIA NATURAL OR ARTIFICIAL OPENING: ICD-10-PCS | Performed by: OBSTETRICS & GYNECOLOGY

## 2021-01-08 PROCEDURE — 722N000001 HC LABOR CARE VAGINAL DELIVERY SINGLE

## 2021-01-08 PROCEDURE — 0HQ9XZZ REPAIR PERINEUM SKIN, EXTERNAL APPROACH: ICD-10-PCS | Performed by: OBSTETRICS & GYNECOLOGY

## 2021-01-08 PROCEDURE — 999N000011 HC STATISTIC ANESTHESIA CASE

## 2021-01-08 PROCEDURE — 00HU33Z INSERTION OF INFUSION DEVICE INTO SPINAL CANAL, PERCUTANEOUS APPROACH: ICD-10-PCS | Performed by: ANESTHESIOLOGY

## 2021-01-08 PROCEDURE — 250N000011 HC RX IP 250 OP 636: Performed by: OBSTETRICS & GYNECOLOGY

## 2021-01-08 PROCEDURE — 250N000009 HC RX 250: Performed by: ANESTHESIOLOGY

## 2021-01-08 PROCEDURE — 3E0R3BZ INTRODUCTION OF ANESTHETIC AGENT INTO SPINAL CANAL, PERCUTANEOUS APPROACH: ICD-10-PCS | Performed by: ANESTHESIOLOGY

## 2021-01-08 PROCEDURE — 258N000003 HC RX IP 258 OP 636: Performed by: OBSTETRICS & GYNECOLOGY

## 2021-01-08 RX ORDER — SCOLOPAMINE TRANSDERMAL SYSTEM 1 MG/1
1 PATCH, EXTENDED RELEASE TRANSDERMAL ONCE
Status: DISCONTINUED | OUTPATIENT
Start: 2021-01-08 | End: 2021-01-08

## 2021-01-08 RX ORDER — OXYCODONE HYDROCHLORIDE 5 MG/1
5 TABLET ORAL EVERY 4 HOURS PRN
Status: DISCONTINUED | OUTPATIENT
Start: 2021-01-08 | End: 2021-01-10 | Stop reason: HOSPADM

## 2021-01-08 RX ORDER — BISACODYL 10 MG
10 SUPPOSITORY, RECTAL RECTAL DAILY PRN
Status: DISCONTINUED | OUTPATIENT
Start: 2021-01-10 | End: 2021-01-10 | Stop reason: HOSPADM

## 2021-01-08 RX ORDER — NALBUPHINE HYDROCHLORIDE 20 MG/ML
2.5-5 INJECTION, SOLUTION INTRAMUSCULAR; INTRAVENOUS; SUBCUTANEOUS EVERY 6 HOURS PRN
Status: DISCONTINUED | OUTPATIENT
Start: 2021-01-08 | End: 2021-01-08

## 2021-01-08 RX ORDER — OXYTOCIN/0.9 % SODIUM CHLORIDE 30/500 ML
340 PLASTIC BAG, INJECTION (ML) INTRAVENOUS CONTINUOUS PRN
Status: DISCONTINUED | OUTPATIENT
Start: 2021-01-08 | End: 2021-01-10 | Stop reason: HOSPADM

## 2021-01-08 RX ORDER — ONDANSETRON 4 MG/1
4 TABLET, ORALLY DISINTEGRATING ORAL EVERY 6 HOURS PRN
Status: DISCONTINUED | OUTPATIENT
Start: 2021-01-08 | End: 2021-01-08

## 2021-01-08 RX ORDER — LIDOCAINE HYDROCHLORIDE AND EPINEPHRINE 15; 5 MG/ML; UG/ML
INJECTION, SOLUTION EPIDURAL PRN
Status: DISCONTINUED | OUTPATIENT
Start: 2021-01-08 | End: 2021-01-08

## 2021-01-08 RX ORDER — PRENATAL VIT/IRON FUM/FOLIC AC 27MG-0.8MG
1 TABLET ORAL DAILY
Status: DISCONTINUED | OUTPATIENT
Start: 2021-01-08 | End: 2021-01-10 | Stop reason: HOSPADM

## 2021-01-08 RX ORDER — TRANEXAMIC ACID 10 MG/ML
1 INJECTION, SOLUTION INTRAVENOUS EVERY 30 MIN PRN
Status: DISCONTINUED | OUTPATIENT
Start: 2021-01-08 | End: 2021-01-10 | Stop reason: HOSPADM

## 2021-01-08 RX ORDER — IBUPROFEN 800 MG/1
800 TABLET, FILM COATED ORAL EVERY 6 HOURS PRN
Status: DISCONTINUED | OUTPATIENT
Start: 2021-01-08 | End: 2021-01-10 | Stop reason: HOSPADM

## 2021-01-08 RX ORDER — AMOXICILLIN 250 MG
2 CAPSULE ORAL 2 TIMES DAILY
Status: DISCONTINUED | OUTPATIENT
Start: 2021-01-08 | End: 2021-01-10 | Stop reason: HOSPADM

## 2021-01-08 RX ORDER — NALOXONE HYDROCHLORIDE 0.4 MG/ML
0.4 INJECTION, SOLUTION INTRAMUSCULAR; INTRAVENOUS; SUBCUTANEOUS
Status: DISCONTINUED | OUTPATIENT
Start: 2021-01-08 | End: 2021-01-10 | Stop reason: HOSPADM

## 2021-01-08 RX ORDER — AMOXICILLIN 250 MG
1 CAPSULE ORAL 2 TIMES DAILY
Status: DISCONTINUED | OUTPATIENT
Start: 2021-01-08 | End: 2021-01-10 | Stop reason: HOSPADM

## 2021-01-08 RX ORDER — OXYTOCIN/0.9 % SODIUM CHLORIDE 30/500 ML
100 PLASTIC BAG, INJECTION (ML) INTRAVENOUS CONTINUOUS
Status: DISCONTINUED | OUTPATIENT
Start: 2021-01-08 | End: 2021-01-10 | Stop reason: HOSPADM

## 2021-01-08 RX ORDER — MODIFIED LANOLIN
OINTMENT (GRAM) TOPICAL
Status: DISCONTINUED | OUTPATIENT
Start: 2021-01-08 | End: 2021-01-10 | Stop reason: HOSPADM

## 2021-01-08 RX ORDER — EPHEDRINE SULFATE 50 MG/ML
5 INJECTION, SOLUTION INTRAMUSCULAR; INTRAVENOUS; SUBCUTANEOUS
Status: DISCONTINUED | OUTPATIENT
Start: 2021-01-08 | End: 2021-01-08

## 2021-01-08 RX ORDER — HYDROCORTISONE 2.5 %
CREAM (GRAM) TOPICAL 3 TIMES DAILY PRN
Status: DISCONTINUED | OUTPATIENT
Start: 2021-01-08 | End: 2021-01-10 | Stop reason: HOSPADM

## 2021-01-08 RX ORDER — OXYTOCIN/0.9 % SODIUM CHLORIDE 30/500 ML
1-24 PLASTIC BAG, INJECTION (ML) INTRAVENOUS CONTINUOUS
Status: DISCONTINUED | OUTPATIENT
Start: 2021-01-08 | End: 2021-01-08

## 2021-01-08 RX ORDER — LIDOCAINE 40 MG/G
CREAM TOPICAL
Status: DISCONTINUED | OUTPATIENT
Start: 2021-01-08 | End: 2021-01-08

## 2021-01-08 RX ORDER — BUPIVACAINE HYDROCHLORIDE 2.5 MG/ML
INJECTION, SOLUTION EPIDURAL; INFILTRATION; INTRACAUDAL PRN
Status: DISCONTINUED | OUTPATIENT
Start: 2021-01-08 | End: 2021-01-08

## 2021-01-08 RX ORDER — ACETAMINOPHEN 325 MG/1
650 TABLET ORAL EVERY 4 HOURS PRN
Status: DISCONTINUED | OUTPATIENT
Start: 2021-01-08 | End: 2021-01-10 | Stop reason: HOSPADM

## 2021-01-08 RX ORDER — OXYTOCIN 10 [USP'U]/ML
10 INJECTION, SOLUTION INTRAMUSCULAR; INTRAVENOUS
Status: DISCONTINUED | OUTPATIENT
Start: 2021-01-08 | End: 2021-01-10 | Stop reason: HOSPADM

## 2021-01-08 RX ORDER — ONDANSETRON 2 MG/ML
4 INJECTION INTRAMUSCULAR; INTRAVENOUS EVERY 6 HOURS PRN
Status: DISCONTINUED | OUTPATIENT
Start: 2021-01-08 | End: 2021-01-08

## 2021-01-08 RX ORDER — NALOXONE HYDROCHLORIDE 0.4 MG/ML
0.2 INJECTION, SOLUTION INTRAMUSCULAR; INTRAVENOUS; SUBCUTANEOUS
Status: DISCONTINUED | OUTPATIENT
Start: 2021-01-08 | End: 2021-01-10 | Stop reason: HOSPADM

## 2021-01-08 RX ADMIN — BUPIVACAINE HYDROCHLORIDE 10 ML: 2.5 INJECTION, SOLUTION EPIDURAL; INFILTRATION; INTRACAUDAL at 12:31

## 2021-01-08 RX ADMIN — DOCUSATE SODIUM 50 MG AND SENNOSIDES 8.6 MG 1 TABLET: 8.6; 5 TABLET, FILM COATED ORAL at 20:25

## 2021-01-08 RX ADMIN — Medication 340 ML/HR: at 15:58

## 2021-01-08 RX ADMIN — LIDOCAINE HYDROCHLORIDE,EPINEPHRINE BITARTRATE 3 ML: 15; .005 INJECTION, SOLUTION EPIDURAL; INFILTRATION; INTRACAUDAL; PERINEURAL at 12:26

## 2021-01-08 RX ADMIN — FENTANYL CITRATE 100 MCG: 50 INJECTION, SOLUTION INTRAMUSCULAR; INTRAVENOUS at 11:01

## 2021-01-08 RX ADMIN — Medication 25 MCG: at 05:30

## 2021-01-08 RX ADMIN — SODIUM CHLORIDE, POTASSIUM CHLORIDE, SODIUM LACTATE AND CALCIUM CHLORIDE 500 ML: 600; 310; 30; 20 INJECTION, SOLUTION INTRAVENOUS at 11:52

## 2021-01-08 RX ADMIN — SODIUM CHLORIDE, POTASSIUM CHLORIDE, SODIUM LACTATE AND CALCIUM CHLORIDE 125 ML/HR: 600; 310; 30; 20 INJECTION, SOLUTION INTRAVENOUS at 08:56

## 2021-01-08 RX ADMIN — Medication 1 MILLI-UNITS/MIN: at 08:58

## 2021-01-08 RX ADMIN — FENTANYL CITRATE 100 MCG: 50 INJECTION, SOLUTION INTRAMUSCULAR; INTRAVENOUS at 09:55

## 2021-01-08 RX ADMIN — Medication 3 MILLI-UNITS/MIN: at 14:25

## 2021-01-08 RX ADMIN — IBUPROFEN 800 MG: 800 TABLET ORAL at 20:24

## 2021-01-08 RX ADMIN — Medication 25 MCG: at 01:33

## 2021-01-08 RX ADMIN — IBUPROFEN 800 MG: 800 TABLET, FILM COATED ORAL at 16:10

## 2021-01-08 RX ADMIN — LIDOCAINE HYDROCHLORIDE,EPINEPHRINE BITARTRATE 2 ML: 15; .005 INJECTION, SOLUTION EPIDURAL; INFILTRATION; INTRACAUDAL; PERINEURAL at 12:28

## 2021-01-08 RX ADMIN — SODIUM CHLORIDE, POTASSIUM CHLORIDE, SODIUM LACTATE AND CALCIUM CHLORIDE: 600; 310; 30; 20 INJECTION, SOLUTION INTRAVENOUS at 12:37

## 2021-01-08 RX ADMIN — Medication: at 12:41

## 2021-01-08 RX ADMIN — Medication 4 MILLI-UNITS/MIN: at 11:04

## 2021-01-08 RX ADMIN — Medication 25 MCG: at 03:36

## 2021-01-08 RX ADMIN — SODIUM CHLORIDE, POTASSIUM CHLORIDE, SODIUM LACTATE AND CALCIUM CHLORIDE 500 ML: 600; 310; 30; 20 INJECTION, SOLUTION INTRAVENOUS at 13:55

## 2021-01-08 NOTE — PLAN OF CARE
Patient doing well. BP WNL. 8th and final ordered dose of cytotec given at 0530. Patient is reporting mild menstrual-like cramps throughout the night, but able to rest. EFM shows moderate variability, accels: present, decels: absent. Regular contractions Q2-4min per toco. Pain management options reviewed with patient, she plans on getting an epidural when it is time. Continue plan of care.

## 2021-01-08 NOTE — ANESTHESIA PREPROCEDURE EVALUATION
Anesthesia Pre-Procedure Evaluation    Patient: Trinity High   MRN: [id number] : 1995          Preoperative Diagnosis: * No surgery found *        Past Medical History:   Diagnosis Date     Menorrhagia      Past Surgical History:   Procedure Laterality Date     BREAST SURGERY      BILATERAL BREAST REDUCTION     DILATION AND EVACUATION N/A 2019    Procedure: SUCTION DILATION AND EVACUATION WITH ULTRASOUND GUIDANCE;  Surgeon: Hector Loaiza MD;  Location: [facility] OR     GYN SURGERY      D&C     Anesthesia Evaluation       history and physical reviewed . Pt has had prior anesthetic. Type: General    No history of anesthetic complications          ROS/MED HX    ENT/Pulmonary:  - neg pulmonary ROS     Neurologic:  - neg neurologic ROS    (-) Other neuro hx and Neuropathy   Cardiovascular:     (+) hypertension--PIH, --. : . . . :. .      (-) taking anticoagulants/antiplatelets   METS/Exercise Tolerance:     Hematologic:  - neg hematologic  ROS       Musculoskeletal:  - neg musculoskeletal ROS       GI/Hepatic:  - neg GI/hepatic ROS       Renal/Genitourinary:  - ROS Renal section negative       Endo:  - neg endo ROS       Psychiatric:         Infectious Disease:  - neg infectious disease ROS       Malignancy:      - no malignancy   Other:                     neg OB ROS            Physical Exam  Normal systems: cardiovascular, pulmonary and dental    Airway   Mallampati: II  TM distance: > 3 FB  Neck ROM: full  Mouth opening: > 3 cm    Dental     Cardiovascular       Pulmonary             Lab Results   Component Value Date    WBC 12.2 (H) 2021    HGB 11.9 2021    HCT 38.2 2021     (L) 2021     2021    POTASSIUM 3.6 2021    CHLORIDE 107 2021    CO2 23 2021    BUN 10 2021    CR 0.60 2021     (H) 2021    RAUL 9.2 2021    ALT 18 2021    AST 21 2021       Preop Vitals  BP Readings from Last 3 Encounters:  "  01/08/21 100/56   08/22/19 105/68    Pulse Readings from Last 3 Encounters:   01/08/21 88   08/22/19 94      Resp Readings from Last 3 Encounters:   01/08/21 16   08/22/19 16    SpO2 Readings from Last 3 Encounters:   01/08/21 96%   08/22/19 96%      Temp Readings from Last 1 Encounters:   01/08/21 98.3  F (36.8  C) (Oral)    Ht Readings from Last 1 Encounters:   01/07/21 1.626 m (5' 4\")      Wt Readings from Last 1 Encounters:   01/07/21 94.8 kg (209 lb)    Estimated body mass index is 35.87 kg/m  as calculated from the following:    Height as of this encounter: 1.626 m (5' 4\").    Weight as of this encounter: 94.8 kg (209 lb).       Anesthesia Plan  Procedure only, no anesthetic delivered    History & Physical Review  History and physical reviewed and following examination; no interval change.    ASA Status:  2 .  OB Epidural Asa: 2       Plan for Epidural         Continuous Labor Epidural: Indication is for labor pain. Following an discussion of the expectations, benefits and risks (including but not limited to nerve damage, infection, bleeding, spinal headache, partial or failed block), the patient appears to understand and consents to proceed. Questions were encouraged and answered.           Postoperative Care      Consents  Anesthetic plan, risks, benefits and alternatives discussed with:  Patient and Patient..                 Fernie Weiss MD                    .  "

## 2021-01-08 NOTE — PLAN OF CARE
1100: Assumed cares on patient. Report received from Amisha SCHAFER RN.  1101: Pt requesting more Fentanyl. 100mcg Fentanyl given.   1152: Pt requesting an epidural. SVE done. SVE 4/80/-1. LR bolus started.   1210: Dr. Weiss paged to come place epidural.   1220: Dr. Weiss here to place epidural.   1330: Mercer catheter placed. SVE done. SVE 7/90/-1.   1355: Fetal heart tones showing recurrent late decelerations down to 70s. Pt repositioned from LL to RL. Dr. Romero called and updated on decelerations. Orders to turn pitocin off and restart at half the dose in 30 min. Pitocin turned off. Will restart at 1425 at 3mu if FHTs resolve.   1425: Pitocin restarted at 3mu.  1430: Variable decel down to 70s. Pt reports she is feeling some rectal pressure. SVE done. SVE 10/100/+1. Dr. Romero paged to update on status.   1440: Dr. Romero updated. Orders received to labor patient down for 30 minutes and then to start pushing.   Sarai Hagen, RN on 1/8/2021 at 12:26 PM

## 2021-01-08 NOTE — PROVIDER NOTIFICATION
01/08/21 0820   Provider Notification   Provider Name/Title R Cho   Method of Notification At Bedside   Request Evaluate in Person   Notification Reason Status Update;SVE   AROM start pit

## 2021-01-08 NOTE — ANESTHESIA PROCEDURE NOTES
Procedure note : epidural catheter      Staff -   Anesthesiologist:  Fernie Weiss MD  Performed By: anesthesiologist  Referred By: juan  Pre-Procedure  Performed by Fernie Weiss MD  Referred by cho  Location: OB    Procedure Times:1/8/2021 12:20 PM and 1/8/2021 12:33 PM  Pre-Anesthestic Checklist: patient identified, IV checked, risks and benefits discussed, informed consent, monitors and equipment checked, pre-op evaluation and at physician/surgeon's request    Timeout  Correct Patient: Yes   Correct Procedure: Yes   Correct Site: Yes   Correct Laterality: N/A   Correct Position: Yes   Site Marked: N/A   .   Procedure Documentation    Diagnosis:labor.    Procedure: epidural catheter, .   Patient Position:sitting Insertion Site:L3-4  (midline approach) Injection technique: LORT saline   Local skin infiltrated with 3 mL of 1% lidocaine.  NAHOMY at 5 cm    Patient Prep/Sterile Barriers; mask, sterile gloves, povidone-iodine 7.5% surgical scrub, patient draped.  .  Needle: Touhy needle   Needle Gauge: 17.    Needle Length (Inches) 3.5   # of attempts: 1 and # of redirects:  .    Catheter: 19 G . .   .  10 cm at skin.   .    Assessment/Narrative  Paresthesias: No.  .  .  Aspiration negative for heme or CSF  . Test dose of 3 mL lidocaine 1.5% w/ 1:200,000 epinephrine at. Test dose negative for signs of intravascular, subdural or intrathecal injection.

## 2021-01-08 NOTE — PROGRESS NOTES
"Feeling crampy after cytotec  /66   Pulse 88   Temp 98.2  F (36.8  C) (Oral)   Resp 16   Ht 1.626 m (5' 4\")   Wt 94.8 kg (209 lb)   BMI 35.87 kg/m     [place]: irregular  FHT 140s, mod variability, no decels  SVE /-2 AROM clear    A/P: 25 year old  @ 38w1d for IOL due to gestational HTN  - FWB Category 1  - Pitocin  - EDWARD prn    Yohana Romero MD   "

## 2021-01-09 LAB
ABO + RH BLD: NORMAL
ABO + RH BLD: NORMAL
BLOOD BANK CMNT PATIENT-IMP: NORMAL
DATE RH IMM GL GVN: NORMAL
FETAL CELL SCN BLD QL ROSETTE: NORMAL
RH IG VIALS RECOM PATIENT: NORMAL

## 2021-01-09 PROCEDURE — 86901 BLOOD TYPING SEROLOGIC RH(D): CPT | Performed by: OBSTETRICS & GYNECOLOGY

## 2021-01-09 PROCEDURE — 120N000001 HC R&B MED SURG/OB

## 2021-01-09 PROCEDURE — 85461 HEMOGLOBIN FETAL: CPT | Performed by: OBSTETRICS & GYNECOLOGY

## 2021-01-09 PROCEDURE — 250N000013 HC RX MED GY IP 250 OP 250 PS 637: Performed by: OBSTETRICS & GYNECOLOGY

## 2021-01-09 PROCEDURE — 36415 COLL VENOUS BLD VENIPUNCTURE: CPT | Performed by: OBSTETRICS & GYNECOLOGY

## 2021-01-09 PROCEDURE — 86900 BLOOD TYPING SEROLOGIC ABO: CPT | Performed by: OBSTETRICS & GYNECOLOGY

## 2021-01-09 PROCEDURE — 250N000011 HC RX IP 250 OP 636: Performed by: OBSTETRICS & GYNECOLOGY

## 2021-01-09 RX ADMIN — IBUPROFEN 800 MG: 800 TABLET ORAL at 08:51

## 2021-01-09 RX ADMIN — DOCUSATE SODIUM 50 MG AND SENNOSIDES 8.6 MG 1 TABLET: 8.6; 5 TABLET, FILM COATED ORAL at 08:51

## 2021-01-09 RX ADMIN — IBUPROFEN 800 MG: 800 TABLET ORAL at 02:57

## 2021-01-09 RX ADMIN — IBUPROFEN 800 MG: 800 TABLET ORAL at 16:48

## 2021-01-09 RX ADMIN — HUMAN RHO(D) IMMUNE GLOBULIN 300 MCG: 300 INJECTION, SOLUTION INTRAMUSCULAR at 08:51

## 2021-01-09 RX ADMIN — IBUPROFEN 800 MG: 800 TABLET ORAL at 23:46

## 2021-01-09 RX ADMIN — PRENATAL VITAMINS-IRON FUMARATE 27 MG IRON-FOLIC ACID 0.8 MG TABLET 1 TABLET: at 08:51

## 2021-01-09 NOTE — ADDENDUM NOTE
Addendum  created 01/09/21 0708 by Manohar Chaves MD    Actions taken from a BestPractice Advisory, Clinical Note Signed

## 2021-01-09 NOTE — ANESTHESIA POSTPROCEDURE EVALUATION
S/P epidural for labor.   I or my partner was immediately available for management of this patient during epidural analgesia infusion.  VSS.  Doing well. Block resolved.  Neuro at baseline. Denies positional headache. Minimal side effects easily managed w/ PRN meds. No apparent anesthetic complications. No follow-up required.    Manohar Chaves, MDPatient: Trinity High    * No procedures listed *    Diagnosis:* No pre-op diagnosis entered *  Diagnosis Additional Information: No value filed.    Anesthesia Type:  Epidural    Note:  Anesthesia Post Evaluation    Last vitals:  Vitals:    01/08/21 1833 01/08/21 2230 01/09/21 0033   BP: 111/57 113/70 122/64   Pulse:  82 90   Resp:  16 16   Temp:  98.4  F (36.9  C) 98  F (36.7  C)   SpO2:            Electronically Signed By: Manohar Chaves MD  January 9, 2021  7:07 AM

## 2021-01-09 NOTE — PLAN OF CARE
Pt is up in room with SBA  and reports adequate pain control. Family is present and supportive. Pt is attentive to infants needs. Breastfeeding, needs assistance. Meeting expected goals.

## 2021-01-09 NOTE — PROGRESS NOTES
[facility]   Obstetrics Progress Note    Subjective: This is the patient's first day since Vaginal delivery. She is doing well.  She is urinating on her own. Pain is controlled with medication.    Objective:   All vitals stable  Temp: 98  F (36.7  C) Temp src: Oral BP: 122/64 Pulse: 90   Resp: 16 SpO2: 95 %        EXAM:  Constitutional: healthy, alert, no distress.   Abdomen: Abdomen soft, non-tender. BS normal. No masses, fundus is firm.  JOINT/EXTREMITIES: extremities normal     Last hemoglobin was   Hemoglobin   Date Value Ref Range Status   01/07/2021 11.9 11.7 - 15.7 g/dL Final   ]    Assessment: Stable postpartum course.    Plan: Routine care. Ambulation encouraged  Breast feeding strategies discussed  Pain control measures as needed  Reportable signs and symptoms dicussed with the patient  Discharge later today    Maria E Almanza MD

## 2021-01-09 NOTE — L&D DELIVERY NOTE
Delivery Date:  01/08/2021      DIAGNOSIS:  A 38-1/7 weeks' gestation, gestational hypertension.      PROCEDURE:  Induction of labor, normal spontaneous vaginal delivery.      ANESTHESIA:  Epidural.      QUANTITATIVE BLOOD LOSS:  297 mL      FINDINGS:  Liveborn male infant.  Weight is 6 pounds 10 ounces.  Apgars were 9 and 9 at 1 and 5 minutes respectively.  There was a first-degree laceration repaired with 3-0 Vicryl.      HISTORY:  The patient is a 25-year-old who presented to the clinic at 38 weeks gestation with elevated blood pressures.  She was sent to Labor and Delivery for induction of labor.  Her preeclampsia labs were negative.  Nonstress test was reactive on presentation.  Group B strep was known to be negative.  She received Cytotec for cervical ripening overnight and the next day was found to be 2 cm dilated, 70% effaced at the -2 station.  Amniotomy was performed with clear fluid.  She was started on Pitocin and she made adequate progress.  She received an epidural for pain control and made rapid progress to become complete soon thereafter.      DESCRIPTION OF PROCEDURE:  The patient pushed for 30 minutes.  At this time the fetal vertex delivered in the right occiput anterior presentation.  There was a single nuchal cord that was reduced on the perineum.  The anterior shoulder delivered easily and the remainder of the body followed spontaneously.  The infant was placed on the patient's abdomen where he was immediately vigorous and crying.  Cord clamping was delayed by 1 minute, at which time the cord was doubly clamped and cut by the father of the baby.  The placenta delivered spontaneously, was found to be intact with a 3-vessel cord.  The cervix, vagina and perineum were inspected.  There was noted to be a first-degree perineal laceration that was repaired with 3-0 Vicryl in the standard fashion.  The patient tolerated the procedure without difficulty and she remained in her delivery room in stable  condition.  Sponge, lap and needle counts were correct.         KAMILAH BARR MD             D: 2021   T: 2021   MT: AYALA      Name:     SABRINA VALLECILLO   MRN:      [id number]        Account:        [id number]   :      1995        Delivery Date:  2021               Document: [id number]

## 2021-01-09 NOTE — PLAN OF CARE
Pt meeting expected outcomes. Up ad sandro and voiding without problems. Caring for self and baby independently. Breastfeeding. Would like to discharge this evening if baby ok to leave.     Baby to stay for further jaundice monitoring so patient will stay until tomorrow. Discharge order canceled.

## 2021-01-09 NOTE — PLAN OF CARE
Data: Trinity High transferred to 444 via wheelchair at 1915. Baby transferred via parent's arms.  Action: Receiving unit notified of transfer: Yes. Patient and family notified of room change. Report given to Tracey Law RN at 1930. Belongings sent to receiving unit. Accompanied by Registered Nurse. Oriented patient to surroundings. Call light within reach. ID bands double-checked with receiving RN.  Response: Patient tolerated transfer and is stable.

## 2021-01-09 NOTE — PLAN OF CARE
VSS. Up independently in the room. Able to provide cares for self and . Partner at bedside and is supportive. Pain managed well with ibuprofen. Breastfeeding with some help from staff at times. Encouraged to call for assistance as needed. Will continue with plan of care.

## 2021-01-10 VITALS
HEART RATE: 76 BPM | OXYGEN SATURATION: 98 % | RESPIRATION RATE: 16 BRPM | DIASTOLIC BLOOD PRESSURE: 68 MMHG | WEIGHT: 209 LBS | SYSTOLIC BLOOD PRESSURE: 107 MMHG | HEIGHT: 64 IN | BODY MASS INDEX: 35.68 KG/M2 | TEMPERATURE: 98.2 F

## 2021-01-10 PROCEDURE — 250N000013 HC RX MED GY IP 250 OP 250 PS 637: Performed by: OBSTETRICS & GYNECOLOGY

## 2021-01-10 PROCEDURE — 999N000080 HC STATISTIC IP LACTATION SERVICES 16-30 MIN

## 2021-01-10 RX ADMIN — IBUPROFEN 800 MG: 800 TABLET ORAL at 06:55

## 2021-01-10 RX ADMIN — PRENATAL VITAMINS-IRON FUMARATE 27 MG IRON-FOLIC ACID 0.8 MG TABLET 1 TABLET: at 10:01

## 2021-01-10 NOTE — PLAN OF CARE
Data: Vital signs within normal limits. Postpartum checks within normal limits - see flow record. Patient eating and drinking normally. Patient able to empty bladder independently and is up ambulating. No apparent signs of infection. Patient performing self cares and is able to care for infant. Able to see lactation today. (See note). Difficult latch due to sleepy ; jaundiced. Parents report 's tongue gets pulled back. Lactation started mother using nipple shield. Continue pumping and hand expression after feedings. Parents plan to supplement with Formula at home until mother's milk comes in. Recommended parents start using at bottle at next feeding for supplementation.  Action: Patient medicated during the shift for pain and cramping. See MAR. Patient reassessed within 1 hour after each medication and pain was improved - patient stated she was comfortable. Patient education done. See flow record.  Response: Positive attachment behaviors observed with infant. Support persons Dontae is present.   Plan: Anticipate discharge on 1/10/21.

## 2021-01-10 NOTE — DISCHARGE INSTRUCTIONS
[facility]: [phone]     Postpartum Vaginal Delivery Instructions  Will monitor BPs at home. Instructed to call office if BP>= 140/90 or any PIH symptoms   Virtual visit in 2 wks and 8 wks in person    Activity       Ask family and friends for help when you need it.    Do not place anything in your vagina for 6 weeks.    You are not restricted on other activities, but take it easy for a few weeks to allow your body to recover from delivery.  You are able to do any activities you feel up to that point.    No driving until you have stopped taking your pain medications (usually two weeks after delivery).     Call your health care provider if you have any of these symptoms:       Increased pain, swelling, redness, or fluid around your stiches from an episiotomy or perineal tear.    A fever above 100.4 F (38 C) with or without chills when placing a thermometer under your tongue.    You soak a sanitary pad with blood within 1 hour, or you see blood clots larger than a golf ball.    Bleeding that lasts more than 6 weeks.    Vaginal discharge that smells bad.    Severe pain, cramping or tenderness in your lower belly area.    A need to urinate more frequently (use the toilet more often), more urgently (use the toilet very quickly), or it burns when you urinate.    Nausea and vomiting.    Redness, swelling or pain around a vein in your leg.    Problems breastfeeding or a red or painful area on your breast.    Chest pain and cough or are gasping for air.    Problems coping with sadness, anxiety, or depression.  If you have any concerns about hurting yourself or the baby, call your provider immediately.     You have questions or concerns after you return home.     Keep your hands clean:  Always wash your hands before touching your perineal area and stitches.  This helps reduce your risk of infection.  If your hands aren't dirty, you may use an alcohol hand-rub to clean your hands. Keep your nails clean and short.

## 2021-01-10 NOTE — PROGRESS NOTES
Doing well.      vss afeb  Abdomen soft and nt  Fundus firm and nt  Extremities +2 edema    A: PPD 2 s/p  induction for elevated BPs      BPs improving  P: Discharge home      Will monitor BPs at home. Instructed to call office if BP>= 140/90 or any PIH symptoms      Virtual visit in 2 wks and 8 wks in person

## 2021-01-10 NOTE — PLAN OF CARE
VSS. Up independently in the room. Able to provide cares for self and . Partner at bedside and is supportive. Pain managed well with ibuprofen. Breastfeeding with some help from staff at times. Encouraged to call for assistance as needed and when feeds are finished for donor milk supplementation for . Will continue with plan of care.

## 2021-01-10 NOTE — LACTATION NOTE
Lactation in to see patient. Baby jaundice and is sleepy at breast. Baby does keep tongue back, discussed tongue exercises with parents to do. Shield given. Baby latched well to shield and nursed well for less than 10 minutes. Needed encouragement. Breast compressions shown and encouraged mother to do throughout feed if baby sleepy, or swallows not heard. Pointed out what swallows sound like.   Plan for discharge home  1. Attempt to breastfeed every 2-3 hours  2. Supplement baby with formula at home till milk comes in. Switch from finger feeding to bottle feeding. Parents in agreement.  3. Patient to continue pumping to bring milk in.    Home today with phototherapy. All questions answered at this time.

## 2021-03-08 ENCOUNTER — AMBULATORY - RIVER FALLS (OUTPATIENT)
Dept: FAMILY MEDICINE | Facility: CLINIC | Age: 26
End: 2021-03-08

## 2021-04-18 ENCOUNTER — HEALTH MAINTENANCE LETTER (OUTPATIENT)
Age: 26
End: 2021-04-18

## 2021-10-02 ENCOUNTER — HEALTH MAINTENANCE LETTER (OUTPATIENT)
Age: 26
End: 2021-10-02

## 2021-10-28 ENCOUNTER — AMBULATORY - RIVER FALLS (OUTPATIENT)
Dept: FAMILY MEDICINE | Facility: CLINIC | Age: 26
End: 2021-10-28

## 2022-05-14 ENCOUNTER — HEALTH MAINTENANCE LETTER (OUTPATIENT)
Age: 27
End: 2022-05-14

## 2022-09-03 ENCOUNTER — HEALTH MAINTENANCE LETTER (OUTPATIENT)
Age: 27
End: 2022-09-03

## 2023-06-03 ENCOUNTER — HEALTH MAINTENANCE LETTER (OUTPATIENT)
Age: 28
End: 2023-06-03

## 2023-09-29 NOTE — NURSING NOTE
[place]  Depression Scale Entered On:  3/18/2021 11:00 AM CDT    Performed On:  3/8/2021 10:59 AM CST by Richa Santo               [place]  Depression Scale   EPDS Able to Laugh, See Funny Side :   As much as I always could   EPDS Look Forward With Enjoyment Things :   As much as I ever did   EPDS Blame Myself When Things Went Wrong :   No, never   EPDS Anxious/Worried for No Good Reason :   No, not at all   EPDS Scared/Panicky for No Good Reason :   No, not at all   EPDS Things Have Been Getting on Top of Me :   No, I have been coping as well as ever   EPDS Unhappy, Difficulty Sleeping :   No, not at all   EPDS I Have Felt Sad or Miserable :   No, not at all   EPDS So Unhappy, You Have Been Crying :   No, never   EPDS Thought of Harming Self :   Never   Score :   0    Richa Santo - 3/18/2021 10:59 AM CDT   Discharge paperwork given to patient and patient niece. Both verbalized understanding of education given and all questions/concerns addressed. IV nad heart monitor removed.

## 2023-10-12 ENCOUNTER — MEDICAL CORRESPONDENCE (OUTPATIENT)
Dept: HEALTH INFORMATION MANAGEMENT | Facility: CLINIC | Age: 28
End: 2023-10-12
Payer: COMMERCIAL

## 2024-01-11 ENCOUNTER — MEDICAL CORRESPONDENCE (OUTPATIENT)
Dept: HEALTH INFORMATION MANAGEMENT | Facility: CLINIC | Age: 29
End: 2024-01-11

## (undated) DEVICE — GLOVE PROTEXIS W/NEU-THERA 7.5  2D73TE75

## (undated) DEVICE — SUCTION CANNULA UTERINE 12MM CVD  21555

## (undated) DEVICE — SOL WATER IRRIG 1000ML BOTTLE 2F7114

## (undated) DEVICE — LINEN TOWEL PACK X5 5464

## (undated) DEVICE — TUBING VACUUM COLLECTION 6FT 23116

## (undated) DEVICE — CATH INTERMITTENT CLEAN-CATH FEMALE 14FR 6" VINYL LF 420614

## (undated) DEVICE — KIT SURGICAL TURNOVER FVSD-01D

## (undated) DEVICE — PACK TVT HYSTEROSCOPY SMA15HYFSE

## (undated) RX ORDER — ACETAMINOPHEN 325 MG/1
TABLET ORAL
Status: DISPENSED
Start: 2019-08-22

## (undated) RX ORDER — ONDANSETRON 2 MG/ML
INJECTION INTRAMUSCULAR; INTRAVENOUS
Status: DISPENSED
Start: 2019-08-22

## (undated) RX ORDER — CEFAZOLIN SODIUM 1 G/50ML
SOLUTION INTRAVENOUS
Status: DISPENSED
Start: 2019-08-22

## (undated) RX ORDER — LIDOCAINE HYDROCHLORIDE 20 MG/ML
INJECTION, SOLUTION EPIDURAL; INFILTRATION; INTRACAUDAL; PERINEURAL
Status: DISPENSED
Start: 2019-08-22

## (undated) RX ORDER — DEXAMETHASONE SODIUM PHOSPHATE 4 MG/ML
INJECTION, SOLUTION INTRA-ARTICULAR; INTRALESIONAL; INTRAMUSCULAR; INTRAVENOUS; SOFT TISSUE
Status: DISPENSED
Start: 2019-08-22

## (undated) RX ORDER — PROPOFOL 10 MG/ML
INJECTION, EMULSION INTRAVENOUS
Status: DISPENSED
Start: 2019-08-22

## (undated) RX ORDER — FENTANYL CITRATE 50 UG/ML
INJECTION, SOLUTION INTRAMUSCULAR; INTRAVENOUS
Status: DISPENSED
Start: 2019-08-22